# Patient Record
Sex: MALE | Race: BLACK OR AFRICAN AMERICAN | Employment: PART TIME | URBAN - METROPOLITAN AREA
[De-identification: names, ages, dates, MRNs, and addresses within clinical notes are randomized per-mention and may not be internally consistent; named-entity substitution may affect disease eponyms.]

---

## 2019-03-17 ENCOUNTER — HOSPITAL ENCOUNTER (EMERGENCY)
Facility: HOSPITAL | Age: 54
Discharge: HOME | End: 2019-03-17
Attending: EMERGENCY MEDICINE
Payer: MEDICAID

## 2019-03-17 VITALS
OXYGEN SATURATION: 96 % | HEIGHT: 69 IN | SYSTOLIC BLOOD PRESSURE: 132 MMHG | HEART RATE: 70 BPM | TEMPERATURE: 98 F | BODY MASS INDEX: 29.62 KG/M2 | WEIGHT: 200 LBS | DIASTOLIC BLOOD PRESSURE: 91 MMHG | RESPIRATION RATE: 10 BRPM

## 2019-03-17 DIAGNOSIS — R07.9 CHEST PAIN OF UNCERTAIN ETIOLOGY: Primary | ICD-10-CM

## 2019-03-17 LAB
ANION GAP SERPL CALC-SCNC: 6 MMOL/L (ref 0–18)
BASOPHILS # BLD AUTO: 0.04 X10(3) UL (ref 0–0.2)
BASOPHILS NFR BLD AUTO: 0.8 %
BUN BLD-MCNC: 17 MG/DL (ref 7–18)
BUN/CREAT SERPL: 15.6 (ref 10–20)
CALCIUM BLD-MCNC: 8.6 MG/DL (ref 8.5–10.1)
CHLORIDE SERPL-SCNC: 103 MMOL/L (ref 98–107)
CO2 SERPL-SCNC: 29 MMOL/L (ref 21–32)
CREAT BLD-MCNC: 1.09 MG/DL (ref 0.7–1.3)
DEPRECATED RDW RBC AUTO: 46.7 FL (ref 35.1–46.3)
EOSINOPHIL # BLD AUTO: 0.04 X10(3) UL (ref 0–0.7)
EOSINOPHIL NFR BLD AUTO: 0.8 %
ERYTHROCYTE [DISTWIDTH] IN BLOOD BY AUTOMATED COUNT: 15.6 % (ref 11–15)
GLUCOSE BLD-MCNC: 73 MG/DL (ref 70–99)
HCT VFR BLD AUTO: 45.2 % (ref 39–53)
HGB BLD-MCNC: 14.4 G/DL (ref 13–17.5)
IMM GRANULOCYTES # BLD AUTO: 0.01 X10(3) UL (ref 0–1)
IMM GRANULOCYTES NFR BLD: 0.2 %
LYMPHOCYTES # BLD AUTO: 1.75 X10(3) UL (ref 1–4)
LYMPHOCYTES NFR BLD AUTO: 33.2 %
MCH RBC QN AUTO: 26.6 PG (ref 26–34)
MCHC RBC AUTO-ENTMCNC: 31.9 G/DL (ref 31–37)
MCV RBC AUTO: 83.5 FL (ref 80–100)
MONOCYTES # BLD AUTO: 0.73 X10(3) UL (ref 0.1–1)
MONOCYTES NFR BLD AUTO: 13.9 %
NEUTROPHILS # BLD AUTO: 2.7 X10 (3) UL (ref 1.5–7.7)
NEUTROPHILS # BLD AUTO: 2.7 X10(3) UL (ref 1.5–7.7)
NEUTROPHILS NFR BLD AUTO: 51.1 %
OSMOLALITY SERPL CALC.SUM OF ELEC: 286 MOSM/KG (ref 275–295)
PLATELET # BLD AUTO: 324 10(3)UL (ref 150–450)
POTASSIUM SERPL-SCNC: 3.3 MMOL/L (ref 3.5–5.1)
RBC # BLD AUTO: 5.41 X10(6)UL (ref 4.3–5.7)
SODIUM SERPL-SCNC: 138 MMOL/L (ref 136–145)
TROPONIN I SERPL-MCNC: <0.045 NG/ML (ref ?–0.04)
WBC # BLD AUTO: 5.3 X10(3) UL (ref 4–11)

## 2019-03-17 PROCEDURE — 84484 ASSAY OF TROPONIN QUANT: CPT | Performed by: EMERGENCY MEDICINE

## 2019-03-17 PROCEDURE — 93010 ELECTROCARDIOGRAM REPORT: CPT | Performed by: EMERGENCY MEDICINE

## 2019-03-17 PROCEDURE — 80048 BASIC METABOLIC PNL TOTAL CA: CPT | Performed by: EMERGENCY MEDICINE

## 2019-03-17 PROCEDURE — 99284 EMERGENCY DEPT VISIT MOD MDM: CPT

## 2019-03-17 PROCEDURE — 85025 COMPLETE CBC W/AUTO DIFF WBC: CPT | Performed by: EMERGENCY MEDICINE

## 2019-03-17 PROCEDURE — 93005 ELECTROCARDIOGRAM TRACING: CPT

## 2019-03-17 NOTE — ED NOTES
Patient stating he is feeling better. Chest pain is resolving. Eyes are bloodshot. States he does not want his mother to know about his drug use.

## 2019-03-18 NOTE — ED PROVIDER NOTES
Patient Seen in: [facility] AND [facility] Emergency Department    History   Patient presents with:  Chest Pain Angina (cardiovascular)      HPI    Patient presents to the ED after developing sudden onset right-sided chest pain after inhaling while smoking a cig exhibits no tenderness. Abdominal: Soft. He exhibits no distension. Musculoskeletal: He exhibits no edema or deformity. Neurological: He is alert and oriented to person, place, and time. Skin: Skin is warm and dry.    Psychiatric: He has a normal mo Considerations: Chest wall pain, pneumothorax    Medical Record Review: I personally reviewed available prior medical records for any recent pertinent discharge summaries, testing, and procedures and reviewed those reports. Complicating Factors:  The pat

## 2019-03-28 NOTE — ED PROVIDER NOTES
Patient Seen in: [facility] AND [facility] Emergency Department    History   Patient presents with:  Poisoning/Overdose (metabolic, psychiatric)      HPI    Patient presents to the ED after overdosing on heroin.   He was with a friend tonight with his girlfriend tracheal deviation present. Cardiovascular: Normal rate, regular rhythm, normal heart sounds and intact distal pulses. Pulmonary/Chest: Effort normal and breath sounds normal. No stridor. No respiratory distress. Abdominal: Soft.  He exhibits no diste respiratory depression resolved and patient alert and oriented following. However roughly 1 hour later he again became slightly somnolent although minimal respiratory depression at this time.   IM Narcan administered and patient returned to normal mental s

## 2019-03-28 NOTE — ED NOTES
Patient with decreased resp drive and somnolence despite narcan nebulizer. Per MD Liz Chen, narcan 0.4mg IVP administered as noted. Patient now awake, c/o being cold, family at bedside. On cardiac monitor. Will continue to monitor.

## 2019-03-28 NOTE — ED NOTES
Pt remains awake and alert, requesting more blankets. Pt is tearful and shaky. Pts family remains at bedside. Will continue to monitor.

## 2019-03-28 NOTE — ED NOTES
Pt completely awake and alert. Ambulatory with a steady gait. Per EVA, okay for pt to be d/c to home with family members. Will continue to monitor.

## 2019-03-28 NOTE — ED NOTES
Pt brought in by girlfriend after becoming responsive after taking a bag of heroin. Pts girlfriend states that she administered narcan intranasally about 30min pta. During assessment, pt is drowsy, but wakes to verbal stimulus.  Pt not responding to [employer]

## 2019-03-28 NOTE — ED NOTES
Pt provided with discharge instructions, outpatient resources, and prescription. Verbalized understanding for plan of care at home and follow up. All questions/concerns addressed prior to discharge. Pt wheeled out of er with family members.

## 2020-01-25 ENCOUNTER — APPOINTMENT (OUTPATIENT)
Dept: GENERAL RADIOLOGY | Facility: HOSPITAL | Age: 55
End: 2020-01-25
Payer: MEDICAID

## 2020-01-25 ENCOUNTER — HOSPITAL ENCOUNTER (EMERGENCY)
Facility: HOSPITAL | Age: 55
Discharge: HOME | End: 2020-01-25
Payer: MEDICAID

## 2020-01-25 VITALS
TEMPERATURE: 99 F | BODY MASS INDEX: 30.66 KG/M2 | SYSTOLIC BLOOD PRESSURE: 132 MMHG | HEIGHT: 69 IN | OXYGEN SATURATION: 100 % | WEIGHT: 207 LBS | HEART RATE: 86 BPM | DIASTOLIC BLOOD PRESSURE: 78 MMHG | RESPIRATION RATE: 20 BRPM

## 2020-01-25 DIAGNOSIS — J11.1 INFLUENZA: Primary | ICD-10-CM

## 2020-01-25 LAB
FLUAV + FLUBV RNA SPEC NAA+PROBE: NEGATIVE
FLUAV + FLUBV RNA SPEC NAA+PROBE: NEGATIVE
FLUAV + FLUBV RNA SPEC NAA+PROBE: POSITIVE

## 2020-01-25 PROCEDURE — 99284 EMERGENCY DEPT VISIT MOD MDM: CPT

## 2020-01-25 PROCEDURE — 71046 X-RAY EXAM CHEST 2 VIEWS: CPT

## 2020-01-25 PROCEDURE — 87631 RESP VIRUS 3-5 TARGETS: CPT | Performed by: EMERGENCY MEDICINE

## 2020-01-25 PROCEDURE — 94640 AIRWAY INHALATION TREATMENT: CPT

## 2020-01-25 RX ORDER — LISINOPRIL 10 MG/1
10 TABLET ORAL DAILY
COMMUNITY

## 2020-01-25 RX ORDER — IBUPROFEN 600 MG/1
600 TABLET ORAL EVERY 6 HOURS PRN
Status: DISCONTINUED | OUTPATIENT
Start: 2020-01-25 | End: 2020-01-25

## 2020-01-25 RX ORDER — IBUPROFEN 600 MG/1
600 TABLET ORAL EVERY 8 HOURS PRN
Qty: 30 TABLET | Refills: 0 | Status: SHIPPED | OUTPATIENT
Start: 2020-01-25

## 2020-01-25 RX ORDER — ACETAMINOPHEN 325 MG/1
650 TABLET ORAL ONCE
Status: COMPLETED | OUTPATIENT
Start: 2020-01-25 | End: 2020-01-25

## 2020-01-25 RX ORDER — IPRATROPIUM BROMIDE AND ALBUTEROL SULFATE 2.5; .5 MG/3ML; MG/3ML
3 SOLUTION RESPIRATORY (INHALATION) EVERY 6 HOURS PRN
Status: DISCONTINUED | OUTPATIENT
Start: 2020-01-25 | End: 2020-01-25

## 2020-01-25 RX ORDER — ALBUTEROL SULFATE 90 UG/1
2 AEROSOL, METERED RESPIRATORY (INHALATION) EVERY 4 HOURS PRN
Qty: 1 INHALER | Refills: 0 | Status: SHIPPED | OUTPATIENT
Start: 2020-01-25 | End: 2020-02-24

## 2020-01-25 RX ORDER — IPRATROPIUM BROMIDE AND ALBUTEROL SULFATE 2.5; .5 MG/3ML; MG/3ML
3 SOLUTION RESPIRATORY (INHALATION) ONCE
Status: COMPLETED | OUTPATIENT
Start: 2020-01-25 | End: 2020-01-25

## 2020-01-25 NOTE — ED NOTES
Pt presents with high fever and productive cough since Thursday. Endorses body aches/chills. Denies cp, sob, n/v/d. Wheezes heard on auscultation. PMH HTN, DM  AOx3, RR even/nonlabored.

## 2020-01-26 NOTE — ED PROVIDER NOTES
Patient Seen in: [facility] Emergency Department    History   Patient presents with:  Fever      HPI    Patient presents to the ED complaining of a cough, fever, body aches and chills x2 days. Symptoms persistent and moderate in severity.   Not be and expiratory wheezing, no respiratory distress   Abdominal: Soft. He exhibits no distension. Musculoskeletal:         General: No deformity or edema. Neurological: He is alert and oriented to person, place, and time. Skin: Skin is warm and dry.    P records for any recent pertinent discharge summaries, testing, and procedures and reviewed those reports. Complicating Factors: The patient already has does not have a problem list on file. to contribute to the complexity of this ED evaluation.     ED Co

## 2021-06-18 ENCOUNTER — HOSPITAL ENCOUNTER (EMERGENCY)
Facility: HOSPITAL | Age: 56
Discharge: HOME | End: 2021-06-18
Attending: EMERGENCY MEDICINE
Payer: OTHER MISCELLANEOUS

## 2021-06-18 ENCOUNTER — APPOINTMENT (OUTPATIENT)
Dept: GENERAL RADIOLOGY | Facility: HOSPITAL | Age: 56
End: 2021-06-18
Attending: EMERGENCY MEDICINE
Payer: OTHER MISCELLANEOUS

## 2021-06-18 ENCOUNTER — APPOINTMENT (OUTPATIENT)
Dept: CT IMAGING | Facility: HOSPITAL | Age: 56
End: 2021-06-18
Attending: EMERGENCY MEDICINE
Payer: OTHER MISCELLANEOUS

## 2021-06-18 VITALS
TEMPERATURE: 98 F | HEIGHT: 70 IN | DIASTOLIC BLOOD PRESSURE: 91 MMHG | OXYGEN SATURATION: 97 % | RESPIRATION RATE: 16 BRPM | SYSTOLIC BLOOD PRESSURE: 132 MMHG | WEIGHT: 221 LBS | BODY MASS INDEX: 31.64 KG/M2 | HEART RATE: 80 BPM

## 2021-06-18 DIAGNOSIS — S82.191A OTHER CLOSED FRACTURE OF PROXIMAL END OF RIGHT TIBIA, INITIAL ENCOUNTER: Primary | ICD-10-CM

## 2021-06-18 PROCEDURE — 73700 CT LOWER EXTREMITY W/O DYE: CPT | Performed by: EMERGENCY MEDICINE

## 2021-06-18 PROCEDURE — 73560 X-RAY EXAM OF KNEE 1 OR 2: CPT | Performed by: EMERGENCY MEDICINE

## 2021-06-18 PROCEDURE — 99284 EMERGENCY DEPT VISIT MOD MDM: CPT

## 2021-06-18 RX ORDER — ACETAMINOPHEN 500 MG
1000 TABLET ORAL EVERY 6 HOURS PRN
Qty: 100 TABLET | Refills: 0 | Status: SHIPPED | OUTPATIENT
Start: 2021-06-18

## 2021-06-18 NOTE — ED PROVIDER NOTES
Patient Seen in: [facility] AND [facility] Emergency Department      History   Patient presents with:  Leg or Foot Injury    Stated Complaint: right knee injury     HPI/Subjective:   HPI    Patient presents to the emergency department after injuring his right kn regular rhythm. Heart sounds: No murmur heard. Pulmonary:      Effort: Pulmonary effort is normal. No respiratory distress. Breath sounds: Normal breath sounds. Abdominal:      General: There is no distension.       Palpations: Abdomen is so (CPT=73700)    Result Date: 6/18/2021  CONCLUSION:  1. Comminuted fracture through the base of the tibial spines / intercondylar notch and involving the mesial aspect of the medial tibial plateau.   2. Fracture involves the footplate of the anterior posteri

## 2021-06-18 NOTE — ED INITIAL ASSESSMENT (HPI)
Patient arrives from EMS for right knee injury from hose at work. Denies hitting head. Left elbow abrasion. CMS intact, no deformities noted.  Works for [employer]

## 2024-11-12 ENCOUNTER — APPOINTMENT (OUTPATIENT)
Dept: GENERAL RADIOLOGY | Facility: HOSPITAL | Age: 59
End: 2024-11-12
Payer: COMMERCIAL

## 2024-11-12 ENCOUNTER — HOSPITAL ENCOUNTER (EMERGENCY)
Facility: HOSPITAL | Age: 59
Discharge: HOME | End: 2024-11-12
Attending: EMERGENCY MEDICINE
Payer: COMMERCIAL

## 2024-11-12 VITALS
SYSTOLIC BLOOD PRESSURE: 123 MMHG | HEIGHT: 70 IN | RESPIRATION RATE: 15 BRPM | WEIGHT: 217 LBS | DIASTOLIC BLOOD PRESSURE: 96 MMHG | TEMPERATURE: 98 F | HEART RATE: 81 BPM | BODY MASS INDEX: 31.07 KG/M2 | OXYGEN SATURATION: 98 %

## 2024-11-12 DIAGNOSIS — J45.41 MODERATE PERSISTENT ASTHMA WITH EXACERBATION (HCC): Primary | ICD-10-CM

## 2024-11-12 LAB
ATRIAL RATE: 81 BPM
P AXIS: 57 DEGREES
P-R INTERVAL: 164 MS
Q-T INTERVAL: 358 MS
QRS DURATION: 76 MS
QTC CALCULATION (BEZET): 415 MS
R AXIS: 28 DEGREES
T AXIS: 27 DEGREES
VENTRICULAR RATE: 81 BPM

## 2024-11-12 PROCEDURE — 71045 X-RAY EXAM CHEST 1 VIEW: CPT

## 2024-11-12 PROCEDURE — 93010 ELECTROCARDIOGRAM REPORT: CPT

## 2024-11-12 PROCEDURE — 99284 EMERGENCY DEPT VISIT MOD MDM: CPT

## 2024-11-12 PROCEDURE — 94644 CONT INHLJ TX 1ST HOUR: CPT

## 2024-11-12 PROCEDURE — 93005 ELECTROCARDIOGRAM TRACING: CPT

## 2024-11-12 RX ORDER — ALBUTEROL SULFATE 5 MG/ML
10 SOLUTION RESPIRATORY (INHALATION) ONCE
Status: DISCONTINUED | OUTPATIENT
Start: 2024-11-12 | End: 2024-11-12

## 2024-11-12 RX ORDER — ALBUTEROL SULFATE 5 MG/ML
10 SOLUTION RESPIRATORY (INHALATION) CONTINUOUS
Status: DISCONTINUED | OUTPATIENT
Start: 2024-11-12 | End: 2024-11-12

## 2024-11-12 RX ORDER — PREDNISONE 20 MG/1
60 TABLET ORAL ONCE
Status: COMPLETED | OUTPATIENT
Start: 2024-11-12 | End: 2024-11-12

## 2024-11-12 RX ORDER — ALBUTEROL SULFATE 90 UG/1
2 INHALANT RESPIRATORY (INHALATION) EVERY 4 HOURS PRN
Qty: 1 EACH | Refills: 0 | Status: SHIPPED | OUTPATIENT
Start: 2024-11-12 | End: 2024-12-12

## 2024-11-12 RX ORDER — PREDNISONE 20 MG/1
40 TABLET ORAL DAILY
Qty: 10 TABLET | Refills: 0 | Status: SHIPPED | OUTPATIENT
Start: 2024-11-12 | End: 2024-11-17

## 2024-11-12 NOTE — ED PROVIDER NOTES
Patient Seen in: [facility] Emergency Department    History     Chief Complaint   Patient presents with    Difficulty Breathing       HPI    History of diabetes hypertension and asthma presents to the ED complaining of shortness of breath and coughing for the past 2 days.  He states he feels like he has an exacerbation of his asthma.  Does admit to ongoing cigarette smoking.  Denies fevers chills or other complaints.  Shortness of breath is worse when he walks.  No other complaints.    History reviewed.   Past Medical History:    Diabetes (HCC)    Essential hypertension    Heroin abuse (HCC)       History reviewed. History reviewed. No pertinent surgical history.      Medications :  Prescriptions Prior to Admission[1]     History reviewed. No pertinent family history.    Smoking Status:   Social History     Socioeconomic History    Marital status: Single   Tobacco Use    Smoking status: Every Day    Smokeless tobacco: Never       Constitutional and vital signs reviewed.      Social History and Family History elements reviewed from today, pertinent positives to the presenting problem noted.    Physical Exam     ED Triage Vitals   BP 11/12/24 1437 (!) 143/102   Pulse 11/12/24 1436 85   Resp 11/12/24 1436 18   Temp 11/12/24 1438 97.7 °F (36.5 °C)   Temp src 11/12/24 1438 Oral   SpO2 11/12/24 1436 93 %   O2 Device 11/12/24 1545 None (Room air)       All measures to prevent infection transmission during my interaction with the patient were taken. Handwashing was performed prior to and after the exam.  Stethoscope and any equipment used during my examination was cleaned with super sani-cloth germicidal wipes following the exam.     Physical Exam  Vitals and nursing note reviewed.   Constitutional:       General: He is not in acute distress.     Appearance: He is obese. He is not ill-appearing or toxic-appearing.   HENT:      Head: Normocephalic and atraumatic.   Eyes:      General:         Right eye: No  discharge.         Left eye: No discharge.      Conjunctiva/sclera: Conjunctivae normal.   Neck:      Trachea: No tracheal deviation.   Cardiovascular:      Rate and Rhythm: Normal rate.   Pulmonary:      Effort: Pulmonary effort is normal. No respiratory distress.      Breath sounds: No stridor. Wheezing present.      Comments: Diffuse expiratory wheezing, no respiratory distress  Abdominal:      General: There is no distension.      Palpations: Abdomen is soft.   Musculoskeletal:         General: No deformity.   Skin:     General: Skin is warm and dry.   Neurological:      Mental Status: He is alert and oriented to person, place, and time.   Psychiatric:         Mood and Affect: Mood normal.         Behavior: Behavior normal.         ED Course      Labs Reviewed - No data to display  EKG    Rate, intervals and axes as noted on EKG Report.  Rate: Normal, 81 bpm  Rhythm: Sinus Rhythm  Reading: Normal intervals, nonspecific T wave inversion, abnormal EKG           As Interpreted by me    Imaging Results Available and Reviewed while in ED: XR CHEST AP PORTABLE  (CPT=71045)    Result Date: 11/12/2024  CONCLUSION:  1. Unchanged chest.  No acute appearing disease.    Dictated by (CST): Reji Leija MD on 11/12/2024 at 3:15 PM     Finalized by (CST): Reji Leija MD on 11/12/2024 at 3:16 PM         ED Medications Administered:   Medications   albuterol (Ventolin) (5 MG/ML) 0.5% nebulizer solution 10 mg (10 mg Nebulization New Bag 11/12/24 1621)   predniSONE (Deltasone) tab 60 mg (60 mg Oral Given 11/12/24 1656)         MDM     Vitals:    11/12/24 1438 11/12/24 1545 11/12/24 1630 11/12/24 1645   BP: (!) 145/95 (!) 146/99 (!) 134/96 (!) 123/96   Pulse:  75 75 81   Resp:  11 10 15   Temp: 97.7 °F (36.5 °C)      TempSrc: Oral      SpO2:  92% 100% 98%   Weight:       Height:         *I personally reviewed and interpreted all ED vitals.    Pulse Ox: 92%, Room air, Normal     Monitor Interpretation:   normal sinus rhythm as  interpreted by me.  The cardiac monitor was ordered monitor heart rate.    Differential Diagnosis/ Diagnostic Considerations: Asthma exacerbation, pneumonia, pneumothorax,    Complicating Factors: The patient already has does not have a problem list on file. to contribute to the complexity of this ED evaluation.    Medical Decision Making  Patient presents to the ED with asthma exacerbation symptoms.  Diffuse wheezing on exam and no respiratory distress.  X-ray without pneumonia or other concerning findings.  Patient improved dramatically with an hour-long breathing treatment and oral steroids in the ED.  Wheezing resolved on reexamination and stable for discharge with continued oral steroids and an inhaler.  PCP follow-up recommended.    Problems Addressed:  Moderate persistent asthma with exacerbation (HCC): acute illness or injury    Amount and/or Complexity of Data Reviewed  Radiology: ordered and independent interpretation performed. Decision-making details documented in ED Course.     Details: I personally reviewed the patient's chest x-ray image and noted no pneumothorax or pneumonia    Risk  Prescription drug management.        Condition upon leaving the department: Stable    Disposition and Plan     Clinical Impression:  1. Moderate persistent asthma with exacerbation (HCC)        Disposition:  Discharge    Follow-up:  Sherman Styles MD  [address]  [address] [facility]  [place] [id number] [phone]    Schedule an appointment as soon as possible for a visit in 3 day(s)        Medications Prescribed:  Current Discharge Medication List        START taking these medications    Details   albuterol 108 (90 Base) MCG/ACT Inhalation Aero Soln Inhale 2 puffs into the lungs every 4 (four) hours as needed for Wheezing.  Qty: 1 each, Refills: 0      predniSONE 20 MG Oral Tab Take 2 tablets (40 mg total) by mouth daily for 5 days.  Qty: 10 tablet, Refills: 0                              [1] (Not in a hospital admission)

## 2025-05-11 ENCOUNTER — APPOINTMENT (OUTPATIENT)
Dept: GENERAL RADIOLOGY | Facility: HOSPITAL | Age: 60
End: 2025-05-11
Attending: EMERGENCY MEDICINE
Payer: MEDICAID

## 2025-05-11 ENCOUNTER — HOSPITAL ENCOUNTER (INPATIENT)
Facility: HOSPITAL | Age: 60
LOS: 2 days | Discharge: HOME | End: 2025-05-13
Attending: EMERGENCY MEDICINE | Admitting: INTERNAL MEDICINE
Payer: MEDICAID

## 2025-05-11 DIAGNOSIS — J45.909 ACUTE ASTHMA (HCC): ICD-10-CM

## 2025-05-11 DIAGNOSIS — J96.01 ACUTE HYPOXEMIC RESPIRATORY FAILURE (HCC): Primary | ICD-10-CM

## 2025-05-11 LAB
ALBUMIN SERPL-MCNC: 4.5 G/DL (ref 3.2–4.8)
ALBUMIN/GLOB SERPL: 1.6 {RATIO} (ref 1–2)
ALP LIVER SERPL-CCNC: 90 U/L (ref 45–117)
ALT SERPL-CCNC: 38 U/L (ref 10–49)
ANION GAP SERPL CALC-SCNC: 5 MMOL/L (ref 0–18)
AST SERPL-CCNC: 31 U/L (ref ?–34)
BASOPHILS # BLD AUTO: 0.03 X10(3) UL (ref 0–0.2)
BASOPHILS NFR BLD AUTO: 0.5 %
BILIRUB SERPL-MCNC: 0.5 MG/DL (ref 0.3–1.2)
BUN BLD-MCNC: 9 MG/DL (ref 9–23)
BUN/CREAT SERPL: 11.8 (ref 10–20)
CALCIUM BLD-MCNC: 8.9 MG/DL (ref 8.7–10.4)
CHLORIDE SERPL-SCNC: 105 MMOL/L (ref 98–112)
CO2 SERPL-SCNC: 32 MMOL/L (ref 21–32)
CREAT BLD-MCNC: 0.76 MG/DL (ref 0.7–1.3)
DEPRECATED RDW RBC AUTO: 49.7 FL (ref 35.1–46.3)
EGFRCR SERPLBLD CKD-EPI 2021: 105 ML/MIN/1.73M2 (ref 60–?)
EOSINOPHIL # BLD AUTO: 0.05 X10(3) UL (ref 0–0.7)
EOSINOPHIL NFR BLD AUTO: 0.8 %
ERYTHROCYTE [DISTWIDTH] IN BLOOD BY AUTOMATED COUNT: 16 % (ref 11–15)
EST. AVERAGE GLUCOSE BLD GHB EST-MCNC: 163 MG/DL (ref 68–126)
FLUAV + FLUBV RNA SPEC NAA+PROBE: NEGATIVE
FLUAV + FLUBV RNA SPEC NAA+PROBE: NEGATIVE
GLOBULIN PLAS-MCNC: 2.9 G/DL (ref 2–3.5)
GLUCOSE BLD-MCNC: 84 MG/DL (ref 70–99)
GLUCOSE BLDC GLUCOMTR-MCNC: 144 MG/DL (ref 70–99)
GLUCOSE BLDC GLUCOMTR-MCNC: 211 MG/DL (ref 70–99)
GLUCOSE BLDC GLUCOMTR-MCNC: 87 MG/DL (ref 70–99)
HBA1C MFR BLD: 7.3 % (ref ?–5.7)
HCT VFR BLD AUTO: 43.3 % (ref 39–53)
HGB BLD-MCNC: 13.2 G/DL (ref 13–17.5)
IMM GRANULOCYTES # BLD AUTO: 0.07 X10(3) UL (ref 0–1)
IMM GRANULOCYTES NFR BLD: 1.1 %
LYMPHOCYTES # BLD AUTO: 1.94 X10(3) UL (ref 1–4)
LYMPHOCYTES NFR BLD AUTO: 30.7 %
MCH RBC QN AUTO: 25.7 PG (ref 26–34)
MCHC RBC AUTO-ENTMCNC: 30.5 G/DL (ref 31–37)
MCV RBC AUTO: 84.4 FL (ref 80–100)
MONOCYTES # BLD AUTO: 0.75 X10(3) UL (ref 0.1–1)
MONOCYTES NFR BLD AUTO: 11.9 %
NEUTROPHILS # BLD AUTO: 3.47 X10 (3) UL (ref 1.5–7.7)
NEUTROPHILS # BLD AUTO: 3.47 X10(3) UL (ref 1.5–7.7)
NEUTROPHILS NFR BLD AUTO: 55 %
NT-PROBNP SERPL-MCNC: 74 PG/ML (ref ?–125)
OSMOLALITY SERPL CALC.SUM OF ELEC: 292 MOSM/KG (ref 275–295)
PLATELET # BLD AUTO: 331 10(3)UL (ref 150–450)
POTASSIUM SERPL-SCNC: 3.7 MMOL/L (ref 3.5–5.1)
PROT SERPL-MCNC: 7.4 G/DL (ref 5.7–8.2)
RBC # BLD AUTO: 5.13 X10(6)UL (ref 4.3–5.7)
RSV RNA SPEC NAA+PROBE: NEGATIVE
SARS-COV-2 RNA RESP QL NAA+PROBE: NOT DETECTED
SODIUM SERPL-SCNC: 142 MMOL/L (ref 136–145)
WBC # BLD AUTO: 6.3 X10(3) UL (ref 4–11)

## 2025-05-11 PROCEDURE — 0241U SARS-COV-2/FLU A AND B/RSV BY PCR (GENEXPERT): CPT | Performed by: EMERGENCY MEDICINE

## 2025-05-11 PROCEDURE — 85025 COMPLETE CBC W/AUTO DIFF WBC: CPT | Performed by: EMERGENCY MEDICINE

## 2025-05-11 PROCEDURE — 82962 GLUCOSE BLOOD TEST: CPT

## 2025-05-11 PROCEDURE — 93005 ELECTROCARDIOGRAM TRACING: CPT

## 2025-05-11 PROCEDURE — 93010 ELECTROCARDIOGRAM REPORT: CPT

## 2025-05-11 PROCEDURE — 94645 CONT INHLJ TX EACH ADDL HOUR: CPT

## 2025-05-11 PROCEDURE — 83036 HEMOGLOBIN GLYCOSYLATED A1C: CPT | Performed by: EMERGENCY MEDICINE

## 2025-05-11 PROCEDURE — 71045 X-RAY EXAM CHEST 1 VIEW: CPT | Performed by: EMERGENCY MEDICINE

## 2025-05-11 PROCEDURE — 99285 EMERGENCY DEPT VISIT HI MDM: CPT

## 2025-05-11 PROCEDURE — 96375 TX/PRO/DX INJ NEW DRUG ADDON: CPT

## 2025-05-11 PROCEDURE — 94640 AIRWAY INHALATION TREATMENT: CPT

## 2025-05-11 PROCEDURE — 94760 N-INVAS EAR/PLS OXIMETRY 1: CPT

## 2025-05-11 PROCEDURE — 96365 THER/PROPH/DIAG IV INF INIT: CPT

## 2025-05-11 PROCEDURE — 83880 ASSAY OF NATRIURETIC PEPTIDE: CPT | Performed by: EMERGENCY MEDICINE

## 2025-05-11 PROCEDURE — 94644 CONT INHLJ TX 1ST HOUR: CPT

## 2025-05-11 PROCEDURE — 80053 COMPREHEN METABOLIC PANEL: CPT | Performed by: EMERGENCY MEDICINE

## 2025-05-11 RX ORDER — METHYLPREDNISOLONE SODIUM SUCCINATE 40 MG/ML
40 INJECTION INTRAMUSCULAR; INTRAVENOUS EVERY 8 HOURS
Status: DISPENSED | OUTPATIENT
Start: 2025-05-11 | End: 2025-05-13

## 2025-05-11 RX ORDER — ENOXAPARIN SODIUM 100 MG/ML
40 INJECTION SUBCUTANEOUS DAILY
Status: DISCONTINUED | OUTPATIENT
Start: 2025-05-12 | End: 2025-05-13

## 2025-05-11 RX ORDER — NIFEDIPINE 30 MG
30 TABLET, EXTENDED RELEASE ORAL EVERY MORNING
COMMUNITY

## 2025-05-11 RX ORDER — ACETAMINOPHEN 500 MG
500 TABLET ORAL EVERY 4 HOURS PRN
Status: DISCONTINUED | OUTPATIENT
Start: 2025-05-11 | End: 2025-05-13

## 2025-05-11 RX ORDER — IPRATROPIUM BROMIDE AND ALBUTEROL SULFATE 2.5; .5 MG/3ML; MG/3ML
3 SOLUTION RESPIRATORY (INHALATION)
Status: DISCONTINUED | OUTPATIENT
Start: 2025-05-12 | End: 2025-05-13

## 2025-05-11 RX ORDER — POLYETHYLENE GLYCOL 3350 17 G/17G
17 POWDER, FOR SOLUTION ORAL DAILY PRN
Status: DISCONTINUED | OUTPATIENT
Start: 2025-05-11 | End: 2025-05-13

## 2025-05-11 RX ORDER — NICOTINE POLACRILEX 4 MG
30 LOZENGE BUCCAL
Status: DISCONTINUED | OUTPATIENT
Start: 2025-05-11 | End: 2025-05-13

## 2025-05-11 RX ORDER — ATORVASTATIN CALCIUM 10 MG/1
10 TABLET, FILM COATED ORAL DAILY
Status: DISCONTINUED | OUTPATIENT
Start: 2025-05-12 | End: 2025-05-13

## 2025-05-11 RX ORDER — ALBUTEROL SULFATE 5 MG/ML
10 SOLUTION RESPIRATORY (INHALATION) ONCE
Status: COMPLETED | OUTPATIENT
Start: 2025-05-11 | End: 2025-05-11

## 2025-05-11 RX ORDER — DEXTROSE MONOHYDRATE 25 G/50ML
50 INJECTION, SOLUTION INTRAVENOUS
Status: DISCONTINUED | OUTPATIENT
Start: 2025-05-11 | End: 2025-05-13

## 2025-05-11 RX ORDER — POTASSIUM CHLORIDE 1.5 G/1.58G
40 POWDER, FOR SOLUTION ORAL ONCE
Status: COMPLETED | OUTPATIENT
Start: 2025-05-11 | End: 2025-05-11

## 2025-05-11 RX ORDER — ERGOCALCIFEROL 1.25 MG/1
50000 CAPSULE, LIQUID FILLED ORAL
COMMUNITY

## 2025-05-11 RX ORDER — ATORVASTATIN CALCIUM 10 MG/1
10 TABLET, FILM COATED ORAL DAILY
COMMUNITY

## 2025-05-11 RX ORDER — SENNOSIDES 8.6 MG
17.2 TABLET ORAL NIGHTLY PRN
Status: DISCONTINUED | OUTPATIENT
Start: 2025-05-11 | End: 2025-05-13

## 2025-05-11 RX ORDER — NICOTINE POLACRILEX 4 MG
15 LOZENGE BUCCAL
Status: DISCONTINUED | OUTPATIENT
Start: 2025-05-11 | End: 2025-05-13

## 2025-05-11 RX ORDER — VALSARTAN 320 MG/1
320 TABLET ORAL DAILY
COMMUNITY

## 2025-05-11 RX ORDER — NIFEDIPINE 30 MG/1
30 TABLET, EXTENDED RELEASE ORAL EVERY MORNING
Status: DISCONTINUED | OUTPATIENT
Start: 2025-05-12 | End: 2025-05-12

## 2025-05-11 RX ORDER — ALBUTEROL SULFATE 90 UG/1
2 INHALANT RESPIRATORY (INHALATION) EVERY 6 HOURS PRN
COMMUNITY

## 2025-05-11 RX ORDER — PROCHLORPERAZINE EDISYLATE 5 MG/ML
5 INJECTION INTRAMUSCULAR; INTRAVENOUS EVERY 8 HOURS PRN
Status: DISCONTINUED | OUTPATIENT
Start: 2025-05-11 | End: 2025-05-13

## 2025-05-11 RX ORDER — IPRATROPIUM BROMIDE AND ALBUTEROL SULFATE 2.5; .5 MG/3ML; MG/3ML
3 SOLUTION RESPIRATORY (INHALATION)
Status: DISCONTINUED | OUTPATIENT
Start: 2025-05-11 | End: 2025-05-11

## 2025-05-11 RX ORDER — MULTIVIT-MIN/IRON FUM/FOLIC AC 7.5 MG-4
1 TABLET ORAL DAILY
COMMUNITY

## 2025-05-11 RX ORDER — ONDANSETRON 2 MG/ML
4 INJECTION INTRAMUSCULAR; INTRAVENOUS EVERY 6 HOURS PRN
Status: DISCONTINUED | OUTPATIENT
Start: 2025-05-11 | End: 2025-05-13

## 2025-05-11 RX ORDER — METHYLPREDNISOLONE SODIUM SUCCINATE 40 MG/ML
40 INJECTION INTRAMUSCULAR; INTRAVENOUS ONCE
Status: COMPLETED | OUTPATIENT
Start: 2025-05-11 | End: 2025-05-11

## 2025-05-11 RX ORDER — MAGNESIUM SULFATE HEPTAHYDRATE 40 MG/ML
2 INJECTION, SOLUTION INTRAVENOUS ONCE
Status: COMPLETED | OUTPATIENT
Start: 2025-05-11 | End: 2025-05-11

## 2025-05-11 RX ORDER — VALSARTAN 320 MG/1
320 TABLET ORAL DAILY
Status: DISCONTINUED | OUTPATIENT
Start: 2025-05-12 | End: 2025-05-13

## 2025-05-11 RX ORDER — MONTELUKAST SODIUM 10 MG/1
10 TABLET ORAL DAILY
COMMUNITY

## 2025-05-11 NOTE — H&P
[facility]   INTERNAL MEDICINE HISTORY & PHYSICAL    Subjective:   CHIEF COMPLAINT   Difficulty Breathing    HISTORY OF THE PRESENT ILLNESS    History obtained from patient w/ additional history from review of outside records in care everywhere .    Heladio Riley - 59 year old male presents with dyspnea.    Breathing worse over past few days. Assoc with wheezing. Worse w exertion, better with rest. Better with inhalers.   Asthma after 50, feels similar, has not needed intubation before.   Not on O2 at home.  No chest pain, fevers or chills, swelling in legs  Hasn't seen pulm or had PFT  In ED, afebrile, HR 90s, BP elev. Quad screen neg. CBC/CMP unremarkale. BNP 70s. CXR w/o acute process. Given IV steroids, fannie d/w Dr. Carlos jama consulted. Admitted for further monitoring, testing, & treatment.    REVIEW OF SYSTEMS   Remainder of 12-point ROS negative unless discussed in HPI.     PATIENT HISTORIES  PMHx:  He has a past medical history of Diabetes ([facility]), Essential hypertension, and Heroin abuse ([facility]).  PSHx:  He has no past surgical history on file.   FHx:  His family history is not on file.   SHx: He reports that he has been smoking cigarettes. He has never used smokeless tobacco. He reports that he does not drink alcohol and does not use drugs.    Allergies: He has no known allergies.     Current Outpatient Medications   Medication Instructions    albuterol 108 (90 Base) MCG/ACT Inhalation Aero Soln 2 puffs, Every 6 hours PRN    atorvastatin (LIPITOR) 10 mg, Daily    ergocalciferol (VITAMIN D2) 50,000 Units, Every 7 days    metFORMIN (GLUCOPHAGE) 500 mg, 2 times daily with meals    montelukast (SINGULAIR) 10 mg, Daily    Multiple Vitamins-Minerals (MULTI-VITAMIN/MINERALS) Oral Tab 1 tablet, Daily    NIFEdipine ER (ADALAT CC) 30 mg, Every morning    valsartan (DIOVAN) 320 mg, Daily       Objective:    PHYSICAL EXAMINATION   Vitals: BP (!) 162/107   Pulse 93   Temp 97 °F (36.1 °C) (Temporal)    Resp 14   Wt 230 lb (104.3 kg)   SpO2 97%   BMI 33.00 kg/m²   Gen: NAD, well nourished  Eyes: PERRLA, normal conjunctivae  ENMT: Moist mucous membranes, trachea midline, no pharyngeal erythema, no thyromegaly  CV: RRR, no M/R/G, no peripheral edema  Resp: wheezy, non-labored respirations, symmetric expansion  GI: Soft, NT, ND, + BS, no rebound, no guarding  MSK:  No C/C/E, normal active/passive ROM in C-spine, 5/5 strength in all extremities  Skin: No rashes, visible skin w/o worrisome lesions   Neuro: CN 2-12 grossly intact, sensation intact   Psych: A&Ox3, appropriate mood, conversant w/ linear thought process     DATA REVIEW: LABORATORY VALUES & DIAGNOSTICS  Recent Labs   Lab 05/11/25  1254      K 3.7      CO2 32.0   BUN 9   CREATSERUM 0.76   ANIONGAP 5   GLU 84   CA 8.9   TP 7.4   ALB 4.5   AST 31   ALT 38   ALKPHO 90   BILT 0.5   EGFRCR 105     Recent Labs   Lab 05/11/25  1254   WBC 6.3   HGB 13.2   HCT 43.3   .0   MCV 84.4   MOPERCENT 11.9   EOPERCENT 0.8   BAPERCENT 0.5         Assessment & Plan:    Heladio Riley - 59 year old male admitted 5/11/2025 for dyspnea, presumed asthma exac.    Acute exacerbation of asthma  Acute hypoxic resp failure   - 5L o2 in ED to maintain SpO2. CXR w/o acute process   - Duonebs, steroids, IS/flutter valve airway clearance, RT eval, PTA MDI  - pulm consulted  - Supplemental O2 for goal SpO2 90-93%  - No obvious focal pneumonias - hold abox for now    HTN  HLD  Resume other home meds as able    ACP: full  Ppx: DVT: Enox  Dispo  EDoD:  ~ 5/12 - 5/13    F/u:   - PCP:  PHYSICIAN NONSTAFF    Available via epic secure chat or perfect serve 7A - 7P.    Raj Garcia MD   Internal Medicine - Hospitalist  [facility]   nicotine replacement therapy

## 2025-05-11 NOTE — PLAN OF CARE
Problem: Patient Centered Care  Goal: Patient preferences are identified and integrated in the patient's plan of care  Description: Interventions:- What would you like us to know as we care for you? - Provide timely, complete, and accurate information to patient/family- Incorporate patient and family knowledge, values, beliefs, and cultural backgrounds into the planning and delivery of care- Encourage patient/family to participate in care and decision-making at the level they choose- Honor patient and family perspectives and choices  Outcome: Progressing     Problem: Diabetes/Glucose Control  Goal: Glucose maintained within prescribed range  Description: INTERVENTIONS:- Monitor Blood Glucose as ordered- Assess for signs and symptoms of hyperglycemia and hypoglycemia- Administer ordered medications to maintain glucose within target range- Assess barriers to adequate nutritional intake and initiate nutrition consult as needed- Instruct patient on self management of diabetes  INTERVENTIONS:- Monitor Blood Glucose as ordered- Assess for signs and symptoms of hyperglycemia and hypoglycemia- Administer ordered medications to maintain glucose within target range- Assess barriers to adequate nutritional intake and initiate nutrition consult as needed- Instruct patient on self management of diabetes  Outcome: Progressing     Problem: Patient/Family Goals  Goal: Patient/Family Long Term Goal  Description: Patient's Long Term Goal: Interventions:- - See additional Care Plan goals for specific interventions  Outcome: Progressing  Goal: Patient/Family Short Term Goal  Description: Patient's Short Term Goal: Interventions: - - See additional Care Plan goals for specific interventions  Outcome: Progressing     Problem: RESPIRATORY - ADULT  Goal: Achieves optimal ventilation and oxygenation  Description: INTERVENTIONS:- Assess for changes in respiratory status- Assess for changes in mentation and behavior- Position to facilitate  oxygenation and minimize respiratory effort- Oxygen supplementation based on oxygen saturation or ABGs- Provide Smoking Cessation handout, if applicable- Encourage broncho-pulmonary hygiene including cough, deep breathe, Incentive Spirometry- Assess the need for suctioning and perform as needed- Assess and instruct to report SOB or any respiratory difficulty- Respiratory Therapy support as indicated- Manage/alleviate anxiety- Monitor for signs/symptoms of CO2 retention  Outcome: Progressing     Problem: METABOLIC/FLUID AND ELECTROLYTES - ADULT  Goal: Glucose maintained within prescribed range  Description: INTERVENTIONS:- Monitor Blood Glucose as ordered- Assess for signs and symptoms of hyperglycemia and hypoglycemia- Administer ordered medications to maintain glucose within target range- Assess barriers to adequate nutritional intake and initiate nutrition consult as needed- Instruct patient on self management of diabetes  INTERVENTIONS:- Monitor Blood Glucose as ordered- Assess for signs and symptoms of hyperglycemia and hypoglycemia- Administer ordered medications to maintain glucose within target range- Assess barriers to adequate nutritional intake and initiate nutrition consult as needed- Instruct patient on self management of diabetes  Outcome: Progressing  Goal: Electrolytes maintained within normal limits  Description: INTERVENTIONS:- Monitor labs and rhythm and assess patient for signs and symptoms of electrolyte imbalances- Administer electrolyte replacement as ordered- Monitor response to electrolyte replacements, including rhythm and repeat lab results as appropriate- Fluid restriction as ordered- Instruct patient on fluid and nutrition restrictions as appropriate  Outcome: Progressing

## 2025-05-11 NOTE — ED PROVIDER NOTES
Patient Seen in: [facility] Emergency Department    History     Chief Complaint   Patient presents with    Difficulty Breathing     Stated Complaint: Asthma     HPI    59-year-old male with past medical history of asthma, diabetes, hypertension presenting for evaluation of 4-5 days of ongoing worsening shortness of breath associated with productive cough.  No chest pain.  No leg swelling.  No fevers or chills, sick contacts or recent travel.  Smokes proxy 1 pack of cigarettes per day, denies illicits.  Last asthma exacerbation approximately 7 months ago, no prior admission.  No to be hypoxemic at 79%, improved on 5 L nasal cannula without prior supplemental oxygen requirement.    Past Medical History[1]    Past Surgical History[2]         Family History[3]    Short Social Hx on File[4]    Review of Systems :  Constitutional: As per HPI  Respiratory: (+) cough/dyspnea.  Cardiovascular: Negative for chest pain and palpitations.   Gastrointestinal: Negative for vomiting and abdominal pain.       Positive for stated complaint: Asthma  Other systems are as noted in HPI.  Constitutional and vital signs reviewed.      All other systems reviewed and negative except as noted above.    PSFH elements reviewed from today and agreed except as otherwise stated in HPI.    Physical Exam     ED Triage Vitals   BP 05/11/25 1245 (!) 143/106   Pulse 05/11/25 1245 96   Resp 05/11/25 1245 26   Temp 05/11/25 1245 97 °F (36.1 °C)   Temp src 05/11/25 1245 Temporal   SpO2 05/11/25 1238 (!) 79 %   O2 Device 05/11/25 1238 None (Room air)       Current:BP (!) 143/106   Pulse 96   Temp 97 °F (36.1 °C) (Temporal)   Resp 26   Wt 104.3 kg   SpO2 94%   BMI 33.00 kg/m²         Physical Exam   Constitutional: Tachypneic.  HEENT: MMM.  Head: Normocephalic.   Eyes: No injection.   Cardiovascular: RRR.   Pulmonary/Chest: Tachypneic, diminished aeration with scant expiratory wheezing.  Abdominal: Soft.   Musculoskeletal: No gross  deformity.  Neurological: Alert.   Skin: Skin is warm.   Psychiatric: Cooperative.  Nursing note and vitals reviewed.        ED Course     Labs Reviewed   CBC WITH DIFFERENTIAL WITH PLATELET - Abnormal; Notable for the following components:       Result Value    MCH 25.7 (*)     MCHC 30.5 (*)     RDW-SD 49.7 (*)     RDW 16.0 (*)     All other components within normal limits   COMP METABOLIC PANEL (14) - Normal   PRO BETA NATRIURETIC PEPTIDE - Normal   POCT GLUCOSE - Normal   SARS-COV-2/FLU A AND B/RSV BY PCR (GENEXPERT) - Normal    Narrative:     This test is intended for the qualitative detection and differentiation of SARS-CoV-2, influenza A, influenza B, and respiratory syncytial virus (RSV) viral RNA in nasopharyngeal or nares swabs from individuals suspected of respiratory viral infection consistent with COVID-19 by their healthcare provider. Signs and symptoms of respiratory viral infection due to SARS-CoV-2, influenza, and RSV can be similar.    Test performed using the Xpert Xpress SARS-CoV-2/FLU/RSV (real time RT-PCR)  assay on the [employer] instrument, [employer], [place] [address].   This test is being used under the Food and Drug Administration's Emergency Use Authorization.    The authorized Fact Sheet for Healthcare Providers for this assay is available upon request from the laboratory.   SCAN SLIDE     XR CHEST AP PORTABLE  (CPT=71045)  Result Date: 5/11/2025  PROCEDURE: XR CHEST AP PORTABLE  (CPT=71045) TIME: 1:13 p.m..   COMPARISON: [facility], XR CHEST AP PORTABLE (CPT=71045), 11/12/2024, 3:05 PM.  INDICATIONS: Asthma  TECHNIQUE:   Single view.   FINDINGS:  CARDIAC/VASC: No cardiac silhouette abnormality or cardiomegaly.  Unremarkable pulmonary vasculature.  MEDIAST/PHIL:   No visible mass or adenopathy. LUNGS/PLEURA: No significant pulmonary parenchymal abnormalities.  No effusion or pleural thickening. BONES: Mild degenerative disc disease and spondylosis without visible acute  abnormalities.  OTHER: Negative.          CONCLUSION: No acute cardiopulmonary abnormality.    Dictated by (CST): Suhas Cleveland MD on 5/11/2025 at 1:18 PM     Finalized by (CST): Suhas Cleveland MD on 5/11/2025 at 1:19 PM           EKG    Rate, intervals and axes as noted on EKG Report.  Rate: 95   Rhythm: NSR   Reading: NSR 95 without JERSON as independently interpreted by myself              ED Course as of 05/11/25 1401  ------------------------------------------------------------  Time: 05/11 1348  Comment: Wheezing improved/ongoing after initial nebs/steroids/mag - will admit for continued management.       MDM   DIFFERENTIAL DIAGNOSIS: After history and physical exam differential diagnosis includes but is not limited to acute asthma, pneumothorax, pneumomediastinum, COVID, influenza.    Pulse ox: 94%:Abnormal and Improved after treatment on RA/NC, as independently interpreted by myself    Cardiac Monitor Interpretation:   Pulse Readings from Last 1 Encounters:   05/11/25 96   , sinus,      Medical Decision Making  Evaluation for worsening cough/shortness of breath despite home therapies without chest pain or exertional complaints noted be hypoxemic with scant expiratory wheezing and decreased aeration; symptoms/wheezing and respiratory insufficiency improved though ongoing after initial hour-long neb and parenteral steroids/mag, will admit for continued monitoring/management.  Case discussed with on-call medicine Dr. Garcia for admission and pulmonary Dr. Hernandez in consultation.    Problems Addressed:  Acute asthma (HCC): complicated acute illness or injury with systemic symptoms  Acute hypoxemic respiratory failure (HCC): complicated acute illness or injury with systemic symptoms that poses a threat to life or bodily functions    Amount and/or Complexity of Data Reviewed  Labs: ordered. Decision-making details documented in ED Course.  Radiology: ordered and independent interpretation performed. Decision-making  details documented in ED Course.     Details: CXR without obvious pneumothorax as independently interpreted by myself    ECG/medicine tests: ordered and independent interpretation performed. Decision-making details documented in ED Course.    Risk  Prescription drug management.  Decision regarding hospitalization.    Critical Care  Total time providing critical care: 31 minutes      A total of 31 minutes of critical care time (exclusive of billable procedures) was administered to manage the patient's respiratory instability due to his acute hypoxemic respiratory failure secondary to acute asthma.  This involved direct patient intervention, complex decision making, and/or extensive discussions with the patient, family, and clinical staff.    I was wearing at minimum a facemask and eye protection throughout this encounter with handwashing performed prior and after patient evaluation without personal hand/facial/oropharyngeal contact and gloves worn throughout encounter. See note and/or contact this provider for further PPE details.      Disposition and Plan     Clinical Impression:  1. Acute hypoxemic respiratory failure (HCC)    2. Acute asthma (HCC)        Disposition:  Admit    Follow-up:  No follow-up provider specified.    Medications Prescribed:  Current Discharge Medication List                   [1]   Past Medical History:   Diabetes (HCC)    Essential hypertension    Heroin abuse (HCC)   [2] History reviewed. No pertinent surgical history.  [3] No family history on file.  [4]   Social History  Socioeconomic History    Marital status: Single   Tobacco Use    Smoking status: Every Day     Current packs/day: 1.00     Types: Cigarettes    Smokeless tobacco: Never   Vaping Use    Vaping status: Never Used   Substance and Sexual Activity    Alcohol use: Never    Drug use: Never

## 2025-05-11 NOTE — ED QUICK NOTES
Orders for admission, patient is aware of plan and ready to go upstairs. Any questions, please call ED RN Jurate at extension [phone].     Patient Covid vaccination status: Unvaccinated     COVID Test Ordered in ED: SARS-CoV-2/Flu A and B/RSV by PCR ([employer])    COVID Suspicion at Admission: N/A    Running Infusions: Medication Infusions[1] Mg sulfate @ 150ml/hr.    Mental Status/LOC at time of transport: axox4    Other pertinent information:   CIWA score: N/A   NIH score:  N/A               [1]

## 2025-05-12 ENCOUNTER — APPOINTMENT (OUTPATIENT)
Dept: CV DIAGNOSTICS | Facility: HOSPITAL | Age: 60
End: 2025-05-12
Attending: INTERNAL MEDICINE
Payer: MEDICAID

## 2025-05-12 LAB
ANION GAP SERPL CALC-SCNC: 1 MMOL/L (ref 0–18)
ATRIAL RATE: 95 BPM
BUN BLD-MCNC: 13 MG/DL (ref 9–23)
BUN/CREAT SERPL: 18.3 (ref 10–20)
CALCIUM BLD-MCNC: 8.7 MG/DL (ref 8.7–10.4)
CHLORIDE SERPL-SCNC: 108 MMOL/L (ref 98–112)
CO2 SERPL-SCNC: 31 MMOL/L (ref 21–32)
CREAT BLD-MCNC: 0.71 MG/DL (ref 0.7–1.3)
DEPRECATED RDW RBC AUTO: 49 FL (ref 35.1–46.3)
EGFRCR SERPLBLD CKD-EPI 2021: 107 ML/MIN/1.73M2 (ref 60–?)
ERYTHROCYTE [DISTWIDTH] IN BLOOD BY AUTOMATED COUNT: 16 % (ref 11–15)
GLUCOSE BLD-MCNC: 126 MG/DL (ref 70–99)
GLUCOSE BLDC GLUCOMTR-MCNC: 126 MG/DL (ref 70–99)
GLUCOSE BLDC GLUCOMTR-MCNC: 140 MG/DL (ref 70–99)
GLUCOSE BLDC GLUCOMTR-MCNC: 162 MG/DL (ref 70–99)
GLUCOSE BLDC GLUCOMTR-MCNC: 168 MG/DL (ref 70–99)
GLUCOSE BLDC GLUCOMTR-MCNC: 175 MG/DL (ref 70–99)
HCT VFR BLD AUTO: 42.5 % (ref 39–53)
HGB BLD-MCNC: 12.9 G/DL (ref 13–17.5)
MCH RBC QN AUTO: 25.6 PG (ref 26–34)
MCHC RBC AUTO-ENTMCNC: 30.4 G/DL (ref 31–37)
MCV RBC AUTO: 84.5 FL (ref 80–100)
OSMOLALITY SERPL CALC.SUM OF ELEC: 292 MOSM/KG (ref 275–295)
P AXIS: 66 DEGREES
P-R INTERVAL: 158 MS
PLATELET # BLD AUTO: 336 10(3)UL (ref 150–450)
POTASSIUM SERPL-SCNC: 3.9 MMOL/L (ref 3.5–5.1)
POTASSIUM SERPL-SCNC: 3.9 MMOL/L (ref 3.5–5.1)
Q-T INTERVAL: 298 MS
Q-T INTERVAL: 316 MS
QRS DURATION: 76 MS
QRS DURATION: 90 MS
QTC CALCULATION (BEZET): 397 MS
QTC CALCULATION (BEZET): 464 MS
R AXIS: 39 DEGREES
R AXIS: 62 DEGREES
RBC # BLD AUTO: 5.03 X10(6)UL (ref 4.3–5.7)
SODIUM SERPL-SCNC: 140 MMOL/L (ref 136–145)
T AXIS: 49 DEGREES
T AXIS: 62 DEGREES
VENTRICULAR RATE: 146 BPM
VENTRICULAR RATE: 95 BPM
WBC # BLD AUTO: 11.1 X10(3) UL (ref 4–11)

## 2025-05-12 PROCEDURE — 93306 TTE W/DOPPLER COMPLETE: CPT | Performed by: INTERNAL MEDICINE

## 2025-05-12 PROCEDURE — 94760 N-INVAS EAR/PLS OXIMETRY 1: CPT

## 2025-05-12 PROCEDURE — 93005 ELECTROCARDIOGRAM TRACING: CPT

## 2025-05-12 PROCEDURE — 85027 COMPLETE CBC AUTOMATED: CPT | Performed by: INTERNAL MEDICINE

## 2025-05-12 PROCEDURE — 80048 BASIC METABOLIC PNL TOTAL CA: CPT | Performed by: INTERNAL MEDICINE

## 2025-05-12 PROCEDURE — 94664 DEMO&/EVAL PT USE INHALER: CPT

## 2025-05-12 PROCEDURE — 93010 ELECTROCARDIOGRAM REPORT: CPT | Performed by: INTERNAL MEDICINE

## 2025-05-12 PROCEDURE — 82962 GLUCOSE BLOOD TEST: CPT

## 2025-05-12 PROCEDURE — 94640 AIRWAY INHALATION TREATMENT: CPT

## 2025-05-12 PROCEDURE — 94799 UNLISTED PULMONARY SVC/PX: CPT

## 2025-05-12 PROCEDURE — 84132 ASSAY OF SERUM POTASSIUM: CPT | Performed by: INTERNAL MEDICINE

## 2025-05-12 RX ORDER — DILTIAZEM HYDROCHLORIDE 30 MG/1
30 TABLET, FILM COATED ORAL EVERY 8 HOURS SCHEDULED
Status: DISCONTINUED | OUTPATIENT
Start: 2025-05-12 | End: 2025-05-13

## 2025-05-12 RX ORDER — DILTIAZEM HYDROCHLORIDE 5 MG/ML
10 INJECTION INTRAVENOUS ONCE
Status: COMPLETED | OUTPATIENT
Start: 2025-05-12 | End: 2025-05-12

## 2025-05-12 RX ORDER — ADENOSINE 3 MG/ML
6 INJECTION, SOLUTION INTRAVENOUS ONCE
Status: COMPLETED | OUTPATIENT
Start: 2025-05-12 | End: 2025-05-12

## 2025-05-12 RX ORDER — DILTIAZEM HYDROCHLORIDE 5 MG/ML
INJECTION INTRAVENOUS
Status: COMPLETED
Start: 2025-05-12 | End: 2025-05-12

## 2025-05-12 RX ORDER — PREDNISONE 20 MG/1
40 TABLET ORAL
Status: DISCONTINUED | OUTPATIENT
Start: 2025-05-13 | End: 2025-05-13

## 2025-05-12 RX ORDER — FLUTICASONE PROPIONATE AND SALMETEROL 250; 50 UG/1; UG/1
1 POWDER RESPIRATORY (INHALATION) 2 TIMES DAILY
Status: DISCONTINUED | OUTPATIENT
Start: 2025-05-12 | End: 2025-05-13

## 2025-05-12 NOTE — SIGNIFICANT EVENT
RRT    *See RRT Documentation Record*    Reason the RRT was called: HR sustaining in 140. Elevated blood pressure.  Assessment of patient leading up to RRT: HTN, HR sustaining >130.  Interventions/Testing: EKG, cardizem 10mg IV x2, adenosine 6 mg, cardizem gtt   Patient Outcome/Disposition: Stayed in unit, BP normalized, HR lower  Family Notified: no  Name of family notified: Pt declined

## 2025-05-12 NOTE — PROGRESS NOTES
RRT called to room 505    On my arrival patient lying in bed denying any complaints.  As per RN heart rate in the 130s up to 160s with minimal activity.  Patient earlier admitted with asthma exacerbation.  Denies any chest pain palpitations nausea vomiting or diaphoresis.  Denies any shortness of breath at this time.    On exam  Temperature 98.8  Pulse 148  Respiration 16  /100  Pulse ox 98% on room air  Alert oriented in no distress  Chest clear  Heart tachycardic  Abdomen soft  Extremities no edema    Assessment and plan    SVT  Attempted vagal maneuvers with no success, patient given adenosine again with no change.  This was followed with 20 mg of Cardizem, patient continued to remain tachycardic in the 140s.  Decision made to start Cardizem drip, titrate as needed.    35 minutes of critical care time spent with patient including coordinating care with ancillary staff

## 2025-05-12 NOTE — CONSULTS
Pulmonary Consult     Assessment / Plan:  Acute respiratory failure - due to asthma exacerbation  - supplemental O2 as needed  Asthma exacerbation  - IV to PO steroids tomorrow  - start Advair 250  - scheduled Duonebs  Dispo  - will follow    Dequan Altamirano MD  Pulmonary and Critical Care Medicine      History of Present Illness:   Mr. Riley is a 59 year old smoker with history of asthma who we are asked to see for dyspnea. Noted to have progressive dyspnea and wheezing for the last several days. No fever, chills, chest pain, cough, LE edema. He only has an albuterol inhaler at home. He does not have insurance.     12 point ROS negative except per HPI.    Past Medical History[1]    Past Surgical History[2]    Prescriptions Prior to Admission[3]  Medications Taking[4]   Allergies[5]   Short Social Hx on File[6]    Family History[7]      Exam:  Vitals:    05/12/25 0338 05/12/25 0410 05/12/25 0504 05/12/25 0623   BP: (!) 129/93 (!) 121/91 (!) 134/92    BP Location: Left arm Left arm Left arm    Pulse: 92 87 80 80   Resp:  20     Temp:  98.8 °F (37.1 °C)     TempSrc:  Oral     SpO2:  96%     Weight:         General: no apparent distress, conversant  Skin: no rash, ulcers or subcutaneous nodules  Eyes: anicteric sclerae, moist conjunctivae  Head, ears, nose, throat: atraumatic, oropharynx clear with moist mucous membranes  Neck: trachea midline with no thyromegaly  Heart: regular rate and rhythm, no murmurs / rubs / gallops  Lungs: bibasilar wheezing  Extremities: no edema or cyanosis  Psych: interactive, answering questions appropriately, appropriate affect    Labs:  Reviewed in EMR    Inpatient Medications:  Reviewed in EMR    Imaging:   Chest imaging reviewed         [1]   Past Medical History:   Diabetes (HCC)    Essential hypertension    Heroin abuse (HCC)   [2] History reviewed. No pertinent surgical history.  [3]   Medications Prior to Admission   Medication Sig Dispense Refill Last Dose/Taking    albuterol 108  (90 Base) MCG/ACT Inhalation Aero Soln Inhale 2 puffs into the lungs every 6 (six) hours as needed for Wheezing or Shortness of Breath.   5/11/2025 at  9:00 AM    atorvastatin 10 MG Oral Tab Take 1 tablet (10 mg total) by mouth daily.   5/11/2025 at  7:00 AM    ergocalciferol 1.25 MG (24308 UT) Oral Cap Take 1 capsule (50,000 Units total) by mouth every 7 days.   5/11/2025 at  7:00 AM    Multiple Vitamins-Minerals (MULTI-VITAMIN/MINERALS) Oral Tab Take 1 tablet by mouth daily.   5/11/2025 at  7:00 AM    NIFEdipine ER 30 MG Oral Tablet 24 Hr Take 1 tablet (30 mg total) by mouth every morning.   5/11/2025 at  7:00 AM    valsartan 320 MG Oral Tab Take 1 tablet (320 mg total) by mouth daily.   5/11/2025 at  7:00 AM    montelukast 10 MG Oral Tab Take 1 tablet (10 mg total) by mouth daily.   5/11/2025 at  7:00 AM    metFORMIN HCl 500 MG Oral Tab Take 1 tablet (500 mg total) by mouth 2 (two) times daily with meals.   5/11/2025 at  7:00 AM   [4]   Outpatient Medications Marked as Taking for the 5/11/25 encounter (Hospital Encounter)   Medication Sig Dispense Refill    albuterol 108 (90 Base) MCG/ACT Inhalation Aero Soln Inhale 2 puffs into the lungs every 6 (six) hours as needed for Wheezing or Shortness of Breath.      atorvastatin 10 MG Oral Tab Take 1 tablet (10 mg total) by mouth daily.      ergocalciferol 1.25 MG (48003 UT) Oral Cap Take 1 capsule (50,000 Units total) by mouth every 7 days.      Multiple Vitamins-Minerals (MULTI-VITAMIN/MINERALS) Oral Tab Take 1 tablet by mouth daily.      NIFEdipine ER 30 MG Oral Tablet 24 Hr Take 1 tablet (30 mg total) by mouth every morning.      valsartan 320 MG Oral Tab Take 1 tablet (320 mg total) by mouth daily.      montelukast 10 MG Oral Tab Take 1 tablet (10 mg total) by mouth daily.      metFORMIN HCl 500 MG Oral Tab Take 1 tablet (500 mg total) by mouth 2 (two) times daily with meals.     [5] No Known Allergies  [6]   Social History  Socioeconomic History    Marital status:  Single   Tobacco Use    Smoking status: Every Day     Current packs/day: 1.00     Types: Cigarettes    Smokeless tobacco: Never   Vaping Use    Vaping status: Never Used   Substance and Sexual Activity    Alcohol use: Never    Drug use: Never     Social Drivers of Health     Food Insecurity: No Food Insecurity (5/11/2025)    NCSS - Food Insecurity     Worried About Running Out of Food in the Last Year: No     Ran Out of Food in the Last Year: No   Transportation Needs: No Transportation Needs (5/11/2025)    NCSS - Transportation     Lack of Transportation: No   Housing Stability: Not At Risk (5/11/2025)    NCSS - Housing/Utilities     Has Housing: Yes     Worried About Losing Housing: No     Unable to Get Utilities: No   [7] No family history on file.

## 2025-05-12 NOTE — PLAN OF CARE
RRT overnight. See RRT note. On 4L NC. Was started on Cardizem drip, weaned and stopped at 0500. HR stable in 80's.     Problem: Patient Centered Care  Goal: Patient preferences are identified and integrated in the patient's plan of care  Description: Interventions:- What would you like us to know as we care for you? From home with mother. - Provide timely, complete, and accurate information to patient/family- Incorporate patient and family knowledge, values, beliefs, and cultural backgrounds into the planning and delivery of care- Encourage patient/family to participate in care and decision-making at the level they choose- Honor patient and family perspectives and choices  Outcome: Progressing     Problem: Diabetes/Glucose Control  Goal: Glucose maintained within prescribed range  Description: INTERVENTIONS:- Monitor Blood Glucose as ordered- Assess for signs and symptoms of hyperglycemia and hypoglycemia- Administer ordered medications to maintain glucose within target range- Assess barriers to adequate nutritional intake and initiate nutrition consult as needed- Instruct patient on self management of diabetes  INTERVENTIONS:- Monitor Blood Glucose as ordered- Assess for signs and symptoms of hyperglycemia and hypoglycemia- Administer ordered medications to maintain glucose within target range- Assess barriers to adequate nutritional intake and initiate nutrition consult as needed- Instruct patient on self management of diabetes  Outcome: Progressing     Problem: Patient/Family Goals  Goal: Patient/Family Long Term Goal  Description: Patient's Long Term Goal: Discharge home. Interventions:- Discharge planning. - See additional Care Plan goals for specific interventions  Outcome: Progressing  Goal: Patient/Family Short Term Goal  Description: Patient's Short Term Goal: Back to respiratory baseline. Interventions: - Plan of care, medications. - See additional Care Plan goals for specific interventions  Outcome:  Progressing     Problem: RESPIRATORY - ADULT  Goal: Achieves optimal ventilation and oxygenation  Description: INTERVENTIONS:- Assess for changes in respiratory status- Assess for changes in mentation and behavior- Position to facilitate oxygenation and minimize respiratory effort- Oxygen supplementation based on oxygen saturation or ABGs- Provide Smoking Cessation handout, if applicable- Encourage broncho-pulmonary hygiene including cough, deep breathe, Incentive Spirometry- Assess the need for suctioning and perform as needed- Assess and instruct to report SOB or any respiratory difficulty- Respiratory Therapy support as indicated- Manage/alleviate anxiety- Monitor for signs/symptoms of CO2 retention  Outcome: Progressing     Problem: METABOLIC/FLUID AND ELECTROLYTES - ADULT  Goal: Glucose maintained within prescribed range  Description: INTERVENTIONS:- Monitor Blood Glucose as ordered- Assess for signs and symptoms of hyperglycemia and hypoglycemia- Administer ordered medications to maintain glucose within target range- Assess barriers to adequate nutritional intake and initiate nutrition consult as needed- Instruct patient on self management of diabetes  INTERVENTIONS:- Monitor Blood Glucose as ordered- Assess for signs and symptoms of hyperglycemia and hypoglycemia- Administer ordered medications to maintain glucose within target range- Assess barriers to adequate nutritional intake and initiate nutrition consult as needed- Instruct patient on self management of diabetes  Outcome: Progressing  Goal: Electrolytes maintained within normal limits  Description: INTERVENTIONS:- Monitor labs and rhythm and assess patient for signs and symptoms of electrolyte imbalances- Administer electrolyte replacement as ordered- Monitor response to electrolyte replacements, including rhythm and repeat lab results as appropriate- Fluid restriction as ordered- Instruct patient on fluid and nutrition restrictions as  appropriate  Outcome: Progressing

## 2025-05-12 NOTE — PLAN OF CARE
Problem: Patient Centered Care  Goal: Patient preferences are identified and integrated in the patient's plan of care  Description: Interventions:- What would you like us to know as we care for you? From home with mother. - Provide timely, complete, and accurate information to patient/family- Incorporate patient and family knowledge, values, beliefs, and cultural backgrounds into the planning and delivery of care- Encourage patient/family to participate in care and decision-making at the level they choose- Honor patient and family perspectives and choices  Outcome: Progressing     Problem: Diabetes/Glucose Control  Goal: Glucose maintained within prescribed range  Description: INTERVENTIONS:- Monitor Blood Glucose as ordered- Assess for signs and symptoms of hyperglycemia and hypoglycemia- Administer ordered medications to maintain glucose within target range- Assess barriers to adequate nutritional intake and initiate nutrition consult as needed- Instruct patient on self management of diabetes  INTERVENTIONS:- Monitor Blood Glucose as ordered- Assess for signs and symptoms of hyperglycemia and hypoglycemia- Administer ordered medications to maintain glucose within target range- Assess barriers to adequate nutritional intake and initiate nutrition consult as needed- Instruct patient on self management of diabetes  Outcome: Progressing     Problem: Patient/Family Goals  Goal: Patient/Family Long Term Goal  Description: Patient's Long Term Goal: Discharge home. Interventions:- Discharge planning. - See additional Care Plan goals for specific interventions  Outcome: Progressing  Goal: Patient/Family Short Term Goal  Description: Patient's Short Term Goal: Back to respiratory baseline. Interventions: - Plan of care, medications. - See additional Care Plan goals for specific interventions  Outcome: Progressing     Problem: RESPIRATORY - ADULT  Goal: Achieves optimal ventilation and oxygenation  Description:  INTERVENTIONS:- Assess for changes in respiratory status- Assess for changes in mentation and behavior- Position to facilitate oxygenation and minimize respiratory effort- Oxygen supplementation based on oxygen saturation or ABGs- Provide Smoking Cessation handout, if applicable- Encourage broncho-pulmonary hygiene including cough, deep breathe, Incentive Spirometry- Assess the need for suctioning and perform as needed- Assess and instruct to report SOB or any respiratory difficulty- Respiratory Therapy support as indicated- Manage/alleviate anxiety- Monitor for signs/symptoms of CO2 retention  Outcome: Progressing     Problem: METABOLIC/FLUID AND ELECTROLYTES - ADULT  Goal: Glucose maintained within prescribed range  Description: INTERVENTIONS:- Monitor Blood Glucose as ordered- Assess for signs and symptoms of hyperglycemia and hypoglycemia- Administer ordered medications to maintain glucose within target range- Assess barriers to adequate nutritional intake and initiate nutrition consult as needed- Instruct patient on self management of diabetes  INTERVENTIONS:- Monitor Blood Glucose as ordered- Assess for signs and symptoms of hyperglycemia and hypoglycemia- Administer ordered medications to maintain glucose within target range- Assess barriers to adequate nutritional intake and initiate nutrition consult as needed- Instruct patient on self management of diabetes  Outcome: Progressing  Goal: Electrolytes maintained within normal limits  Description: INTERVENTIONS:- Monitor labs and rhythm and assess patient for signs and symptoms of electrolyte imbalances- Administer electrolyte replacement as ordered- Monitor response to electrolyte replacements, including rhythm and repeat lab results as appropriate- Fluid restriction as ordered- Instruct patient on fluid and nutrition restrictions as appropriate  Outcome: Progressing

## 2025-05-12 NOTE — PROGRESS NOTES
[facility]   INTERNAL MEDICINE PROGRESS NOTE  Subjective:   CHIEF COMPLAINT   Difficulty Breathing    SUBJECTIVE  24 HR EVENTS   Afib w RVR overnight - RRT called  Started on dilt, dilt gtt. Rates better this AM    INPATIENT MEDICATIONS  Scheduled Meds[1]  IV Meds[2]  PRN Meds[3]     Objective:    PHYSICAL EXAMINATION   Vitals: BP (!) 146/98 (BP Location: Left arm)   Pulse 82   Temp 97.8 °F (36.6 °C) (Oral)   Resp 20   Wt 220 lb 11.2 oz (100.1 kg)   SpO2 95%   BMI 31.67 kg/m²   Gen: NAD, well nourished  Eyes: PERRLA, normal conjunctivae  ENMT: Moist mucous membranes, trachea midline  CV: RRR, no M/R/G, no peripheral edema  Resp: wheezy, non-labored respirations, symmetric expansion  GI: Soft, NT, ND, + BS, no rebound, no guarding  MSK:  No C/C/E, normal active/passive ROM in C-spine  Skin: No rashes, visible skin w/o worrisome lesions   Neuro: CN 2-12 grossly intact, sensation intact   Psych: A&Ox3, appropriate mood, conversant w/ linear thought process     DATA REVIEW: LABORATORY VALUES & DIAGNOSTICS  Recent Labs   Lab 05/11/25  1254 05/12/25  0447    140   K 3.7 3.9  3.9    108   CO2 32.0 31.0   BUN 9 13   CREATSERUM 0.76 0.71   ANIONGAP 5 1   GLU 84 126*   CA 8.9 8.7   TP 7.4  --    ALB 4.5  --    AST 31  --    ALT 38  --    ALKPHO 90  --    BILT 0.5  --    EGFRCR 105 107     Recent Labs   Lab 05/11/25  1254 05/12/25  0447   WBC 6.3 11.1*   HGB 13.2 12.9*   HCT 43.3 42.5   .0 336.0   MCV 84.4 84.5   MOPERCENT 11.9  --    EOPERCENT 0.8  --    BAPERCENT 0.5  --          Assessment & Plan:    Heladio Riley - 59 year old male admitted 5/11/2025 for dyspnea, presumed asthma exac.    Acute exacerbation of asthma  Acute hypoxic resp failure   - 5L o2 in ED to maintain SpO2. CXR w/o acute process   - Duonebs, steroids, IS/flutter valve airway clearance, RT eval, PTA MDI  - pulm consulted  - Supplemental O2 for goal SpO2 90-93%  - No obvious focal pneumonias - hold abox for now    New  dx parox a fib w RVR  [  ] Echo  - IV dilt gtt. Will add PO dilt  [  ] Will need to discuss AC    HTN  HLD  Resume other home meds as able    ACP: full  Ppx: DVT: Enox  Dispo  EDoD:  ~ 5/13 - 5/14    F/u:   - PCP:  PHYSICIAN NONSTAFF  - Cardiology    Available via epic secure chat or perfect serve 7A - 7P.    Raj Garcia MD   Internal Medicine - [facility]ist  [facility]       [1] fluticasone-salmeterol, 1 puff, BID  [START ON 5/13/2025] predniSONE, 40 mg, Daily with breakfast  valsartan, 320 mg, Daily  NIFEdipine ER, 30 mg, QAM  atorvastatin, 10 mg, Daily  enoxaparin, 40 mg, Daily  methylPREDNISolone, 40 mg, Q8H  insulin aspart, 1-5 Units, TID CC  ipratropium-albuterol, 3 mL, QID  [2]    [3] acetaminophen, 500 mg, Q4H PRN  melatonin, 3 mg, Nightly PRN  polyethylene glycol (PEG 3350), 17 g, Daily PRN  sennosides, 17.2 mg, Nightly PRN  guaiFENesin, 200 mg, Q4H PRN  ondansetron, 4 mg, Q6H PRN  prochlorperazine, 5 mg, Q8H PRN  glucose, 15 g, Q15 Min PRN   Or  glucose, 15 g, Q15 Min PRN   Or  glucose-vitamin C, 4 tablet, Q15 Min PRN   Or  dextrose, 50 mL, Q15 Min PRN   Or  glucose, 30 g, Q15 Min PRN   Or  glucose, 30 g, Q15 Min PRN   Or  glucose-vitamin C, 8 tablet, Q15 Min PRN

## 2025-05-13 VITALS
WEIGHT: 220.69 LBS | DIASTOLIC BLOOD PRESSURE: 99 MMHG | RESPIRATION RATE: 18 BRPM | TEMPERATURE: 98 F | BODY MASS INDEX: 32 KG/M2 | HEART RATE: 106 BPM | OXYGEN SATURATION: 94 % | SYSTOLIC BLOOD PRESSURE: 151 MMHG

## 2025-05-13 LAB
ANION GAP SERPL CALC-SCNC: 6 MMOL/L (ref 0–18)
BUN BLD-MCNC: 13 MG/DL (ref 9–23)
BUN/CREAT SERPL: 18.8 (ref 10–20)
CALCIUM BLD-MCNC: 9.1 MG/DL (ref 8.7–10.4)
CHLORIDE SERPL-SCNC: 106 MMOL/L (ref 98–112)
CO2 SERPL-SCNC: 31 MMOL/L (ref 21–32)
CREAT BLD-MCNC: 0.69 MG/DL (ref 0.7–1.3)
DEPRECATED RDW RBC AUTO: 49.2 FL (ref 35.1–46.3)
EGFRCR SERPLBLD CKD-EPI 2021: 108 ML/MIN/1.73M2 (ref 60–?)
ERYTHROCYTE [DISTWIDTH] IN BLOOD BY AUTOMATED COUNT: 16 % (ref 11–15)
GLUCOSE BLD-MCNC: 114 MG/DL (ref 70–99)
GLUCOSE BLDC GLUCOMTR-MCNC: 120 MG/DL (ref 70–99)
GLUCOSE BLDC GLUCOMTR-MCNC: 145 MG/DL (ref 70–99)
HCT VFR BLD AUTO: 42.4 % (ref 39–53)
HGB BLD-MCNC: 12.8 G/DL (ref 13–17.5)
MCH RBC QN AUTO: 25.7 PG (ref 26–34)
MCHC RBC AUTO-ENTMCNC: 30.2 G/DL (ref 31–37)
MCV RBC AUTO: 85 FL (ref 80–100)
OSMOLALITY SERPL CALC.SUM OF ELEC: 297 MOSM/KG (ref 275–295)
PLATELET # BLD AUTO: 377 10(3)UL (ref 150–450)
POTASSIUM SERPL-SCNC: 3.5 MMOL/L (ref 3.5–5.1)
RBC # BLD AUTO: 4.99 X10(6)UL (ref 4.3–5.7)
SODIUM SERPL-SCNC: 143 MMOL/L (ref 136–145)
WBC # BLD AUTO: 11.3 X10(3) UL (ref 4–11)

## 2025-05-13 PROCEDURE — 94799 UNLISTED PULMONARY SVC/PX: CPT

## 2025-05-13 PROCEDURE — 85027 COMPLETE CBC AUTOMATED: CPT | Performed by: INTERNAL MEDICINE

## 2025-05-13 PROCEDURE — 94640 AIRWAY INHALATION TREATMENT: CPT

## 2025-05-13 PROCEDURE — 80048 BASIC METABOLIC PNL TOTAL CA: CPT | Performed by: INTERNAL MEDICINE

## 2025-05-13 PROCEDURE — 94760 N-INVAS EAR/PLS OXIMETRY 1: CPT

## 2025-05-13 PROCEDURE — 82962 GLUCOSE BLOOD TEST: CPT

## 2025-05-13 RX ORDER — FLUTICASONE PROPIONATE AND SALMETEROL 250; 50 UG/1; UG/1
1 POWDER RESPIRATORY (INHALATION) 2 TIMES DAILY
Qty: 1 EACH | Refills: 0 | Status: SHIPPED | OUTPATIENT
Start: 2025-05-13

## 2025-05-13 RX ORDER — POTASSIUM CHLORIDE 1500 MG/1
40 TABLET, EXTENDED RELEASE ORAL EVERY 4 HOURS
Status: COMPLETED | OUTPATIENT
Start: 2025-05-13 | End: 2025-05-13

## 2025-05-13 RX ORDER — PREDNISONE 20 MG/1
40 TABLET ORAL
Qty: 10 TABLET | Refills: 0 | Status: SHIPPED | OUTPATIENT
Start: 2025-05-14 | End: 2025-05-19

## 2025-05-13 RX ORDER — DILTIAZEM HYDROCHLORIDE 240 MG/1
240 CAPSULE, COATED, EXTENDED RELEASE ORAL DAILY
Qty: 30 CAPSULE | Refills: 0 | Status: SHIPPED | OUTPATIENT
Start: 2025-05-13

## 2025-05-13 NOTE — DISCHARGE INSTRUCTIONS
General Medical Follow up:  [employer] (healthcare clinic) [email or web address]  [facility] welcomes patients with Medicaid, Medicare, private insurance or no insurance at all, possibly at a discounted rate. [facility] offers low cost prescriptions drugs when seen by a [facility] physician.   [facility] [address], [place] [id number] [phone]  [facility] [place] [address] [facility] or [address] [facility] [phone]  [facility] [address], [place] or [address] [place], [place]  [phone]  [facility] - Primary Care/Onsite Pharmacy [address] [place] [address] [phone]  [facility] [address], [place] [address] [phone]  [facility] [address] [facility] [place] [address] [phone]  [place] [address] [facility], [address] [place] [address]  Jose Armando goodrich john aqui o llame al teléfono [phone] o al [phone].     [facility] provides care for chronic disease management, and offers coordinated, patient-centered care.  Their services include adult health, pediatric health, women's health, dental services, behavioral health services and LGBTQ+ health.  [facility] works to eliminate any barriers that may keep our patients from accessing services. We do this by providing 24-hour access to providers, comprehensive care management, transportation assistance, access to reduced-price pharmaceuticals, on-site laboratories, same and next-day appointments, bilingual staff and translation services.    [facility]- [address] [facility]- [address] [facility], [address], [facility]- [address] [facility]- 373 [facility]- 450 [facility]-[address] [facility], [address], [place],  [facility]- [address] [facility]- [address] [place], [place]   [place]- [address], [facility]- [address], [facility]- [address], [place], [place]     [email or web address]  Main number [phone]    Visit [employer] for discounted prescription drug coupons or [employer] for $4 prescriptions https://www.SendtoNews/cp/4-dollar-prescriptions/3219861    To inquire about [place] Medicaid, call [facility] [phone] or visit [email or web address]/[email or web address]/access/    Healthcare.gov - Marketplace insurance    The Extra Help Program: is designed to help eligible Medicare Part D patients with high out of pocket costs. Contact this program directly by calling [employer] at 1-474.901.6277    Through the Medicare Plan Finder, you can search to find more information about cost and your specific prescription coverage.  www.medicare.gov/find-a-plan.    Community Health Care Program (only apply if you do not Qualify for medicaid)  [facility]  [address]; [address]  [place]  Phone: [phone]  [email or web address]    [facility]  [address] [place] [address]  [phone]  [email or web address]    Questions about financial assistance, application, or uninsured discounts can be directed to [phone]     [place] [address]  [facility]  [address] [place] [address]  Phone: [phone]  Fax: [phone]

## 2025-05-13 NOTE — PLAN OF CARE
Problem: Patient Centered Care  Goal: Patient preferences are identified and integrated in the patient's plan of care  Description: Interventions:- What would you like us to know as we care for you? From home with mother. - Provide timely, complete, and accurate information to patient/family- Incorporate patient and family knowledge, values, beliefs, and cultural backgrounds into the planning and delivery of care- Encourage patient/family to participate in care and decision-making at the level they choose- Honor patient and family perspectives and choices  Outcome: Progressing     Problem: Diabetes/Glucose Control  Goal: Glucose maintained within prescribed range  Description: INTERVENTIONS:- Monitor Blood Glucose as ordered- Assess for signs and symptoms of hyperglycemia and hypoglycemia- Administer ordered medications to maintain glucose within target range- Assess barriers to adequate nutritional intake and initiate nutrition consult as needed- Instruct patient on self management of diabetes  INTERVENTIONS:- Monitor Blood Glucose as ordered- Assess for signs and symptoms of hyperglycemia and hypoglycemia- Administer ordered medications to maintain glucose within target range- Assess barriers to adequate nutritional intake and initiate nutrition consult as needed- Instruct patient on self management of diabetes  Outcome: Progressing     Problem: Patient/Family Goals  Goal: Patient/Family Long Term Goal  Description: Patient's Long Term Goal: Discharge home. Interventions:- Discharge planning. - See additional Care Plan goals for specific interventions  Outcome: Progressing  Goal: Patient/Family Short Term Goal  Description: Patient's Short Term Goal: Back to respiratory baseline. Interventions: - Plan of care, medications. - See additional Care Plan goals for specific interventions  Outcome: Progressing     Problem: RESPIRATORY - ADULT  Goal: Achieves optimal ventilation and oxygenation  Description:  INTERVENTIONS:- Assess for changes in respiratory status- Assess for changes in mentation and behavior- Position to facilitate oxygenation and minimize respiratory effort- Oxygen supplementation based on oxygen saturation or ABGs- Provide Smoking Cessation handout, if applicable- Encourage broncho-pulmonary hygiene including cough, deep breathe, Incentive Spirometry- Assess the need for suctioning and perform as needed- Assess and instruct to report SOB or any respiratory difficulty- Respiratory Therapy support as indicated- Manage/alleviate anxiety- Monitor for signs/symptoms of CO2 retention  Outcome: Progressing     Problem: METABOLIC/FLUID AND ELECTROLYTES - ADULT  Goal: Glucose maintained within prescribed range  Description: INTERVENTIONS:- Monitor Blood Glucose as ordered- Assess for signs and symptoms of hyperglycemia and hypoglycemia- Administer ordered medications to maintain glucose within target range- Assess barriers to adequate nutritional intake and initiate nutrition consult as needed- Instruct patient on self management of diabetes  INTERVENTIONS:- Monitor Blood Glucose as ordered- Assess for signs and symptoms of hyperglycemia and hypoglycemia- Administer ordered medications to maintain glucose within target range- Assess barriers to adequate nutritional intake and initiate nutrition consult as needed- Instruct patient on self management of diabetes  Outcome: Progressing  Goal: Electrolytes maintained within normal limits  Description: INTERVENTIONS:- Monitor labs and rhythm and assess patient for signs and symptoms of electrolyte imbalances- Administer electrolyte replacement as ordered- Monitor response to electrolyte replacements, including rhythm and repeat lab results as appropriate- Fluid restriction as ordered- Instruct patient on fluid and nutrition restrictions as appropriate  Outcome: Progressing    Monitoring vital signs, stable at this time. No acute changes at this moment.  Monitoring  blood glucose levels.  Pain medication provided as needed. Fall precautions in place- bed alarm on, bed locked in lowest position, call light within reach. Frequent rounding by nursing staff.

## 2025-05-13 NOTE — PROGRESS NOTES
Pulmonary Progress Note     Assessment / Plan:  Acute respiratory failure - due to asthma exacerbation  - supplemental O2 as needed  Asthma exacerbation  - IV to PO steroids today  - start Advair 250  - scheduled Duonebs  Dispo  - desat screen. If weaned off supplemental O2 he can be discharged from my perspective. Follow-up in one week with MD or APN.       Subjective:  No complaints. Denies dyspnea, wheezing, cough. Wants to go home.     Objective:  Vitals:    05/12/25 2055 05/12/25 2341 05/13/25 0455 05/13/25 0838   BP: 131/82 136/84 (!) 137/92 (!) 150/93   BP Location: Left arm Left arm Left arm Right arm   Pulse: 90 79 83 104   Resp: 17 18 17 16   Temp: 98.6 °F (37 °C) 98.4 °F (36.9 °C) 98.3 °F (36.8 °C) 97.9 °F (36.6 °C)   TempSrc: Oral Axillary Oral Oral   SpO2: 95% 98% 94% 92%   Weight:         Physical Exam:  General: no apparent distress, conversant  Skin: no rash, ulcers or subcutaneous nodules  Eyes: anicteric sclerae, moist conjunctivae  Head, ears, nose, throat: atraumatic, oropharynx clear with moist mucous membranes  Neck: trachea midline with no thyromegaly  Heart: regular rate and rhythm, no murmurs / rubs / gallops  Lungs: clear bilaterally, normal respiratory effort, no accessory muscle use  Extremities: no edema or cyanosis  Psych: interactive, answering questions appropriately, appropriate affect    Medications:  Reviewed in EMR    Lab Data:  Reviewed in EMR    Imaging:  I independently visualized all relevant chest imaging in PACS and agree with radiology interpretation except where noted.

## 2025-05-13 NOTE — PROGRESS NOTES
[facility]   INTERNAL MEDICINE PROGRESS NOTE  Subjective:   CHIEF COMPLAINT   Difficulty Breathing    SUBJECTIVE  24 HR EVENTS   SR since rrt  Started on dilt, dilt gtt. Rates better this AM    INPATIENT MEDICATIONS  Scheduled Meds[1]  IV Meds[2]  PRN Meds[3]     Objective:    PHYSICAL EXAMINATION   Vitals: BP (!) 150/93 (BP Location: Right arm)   Pulse 104   Temp 97.9 °F (36.6 °C) (Oral)   Resp 16   Wt 220 lb 11.2 oz (100.1 kg)   SpO2 92%   BMI 31.67 kg/m²   Gen: NAD, well nourished  Eyes: PERRLA, normal conjunctivae  ENMT: Moist mucous membranes, trachea midline  CV: RRR, no M/R/G, no peripheral edema  Resp: wheezy, non-labored respirations, symmetric expansion  GI: Soft, NT, ND, + BS, no rebound, no guarding  MSK:  No C/C/E, normal active/passive ROM in C-spine  Skin: No rashes, visible skin w/o worrisome lesions   Neuro: CN 2-12 grossly intact, sensation intact   Psych: A&Ox3, appropriate mood, conversant w/ linear thought process     DATA REVIEW: LABORATORY VALUES & DIAGNOSTICS  Recent Labs   Lab 05/11/25  1254 05/12/25 0447 05/13/25  0537    140 143   K 3.7 3.9  3.9 3.5    108 106   CO2 32.0 31.0 31.0   BUN 9 13 13   CREATSERUM 0.76 0.71 0.69*   ANIONGAP 5 1 6   GLU 84 126* 114*   CA 8.9 8.7 9.1   TP 7.4  --   --    ALB 4.5  --   --    AST 31  --   --    ALT 38  --   --    ALKPHO 90  --   --    BILT 0.5  --   --    EGFRCR 105 107 108     Recent Labs   Lab 05/11/25  1254 05/12/25 0447 05/13/25  0537   WBC 6.3 11.1* 11.3*   HGB 13.2 12.9* 12.8*   HCT 43.3 42.5 42.4   .0 336.0 377.0   MCV 84.4 84.5 85.0   MOPERCENT 11.9  --   --    EOPERCENT 0.8  --   --    BAPERCENT 0.5  --   --          Assessment & Plan:    Heladio Riley - 59 year old male admitted 5/11/2025 for dyspnea, presumed asthma exac.    Acute exacerbation of asthma  Acute hypoxic resp failure   - 5L o2 in ED to maintain SpO2. CXR w/o acute process   - Duonebs, steroids, IS/flutter valve airway clearance, RT  JEAN MARIE neff MDI  - pulm consulted  - Supplemental O2 for goal SpO2 90-93%  - No obvious focal pneumonias - hold abox for now  - exercise oximetry:      05/13/25 1159   Mobility   O2 walk? Yes   SPO2% on Room Air at Rest 93   SPO2% on Oxygen at Rest 94   At rest oxygen flow (liters per minute) 2   SPO2% Ambulation on Room Air 86   SPO2% Ambulation on Oxygen 90   Ambulation oxygen flow (liters per minute) 2               I had a face to face visit with this Heladio Riley and I evaluated the patient's O2 saturations/walking O2 study completed by nursing 5/13/2025.  The patient is mobile in their home and is in need of a portable oxygen tank.  The home oxygen will improve Heladio Riley's condition.  Patient requires 2L O2 with ambulation.    New dx parox a fib w RVR  - Echo okay  - PO dilt  CV 2 - discusssed AC. Agreeable - will start eliquis. Will need cardiology f/u    DM2   - Hold PTA oral meds. SSI. Accuchecks w/ meals. Hypoglycemia protocol      HTN  HLD  Resume other home meds as able    ACP: full  Ppx: DVT: Enox  Dispo  EDoD:  ~ 5/13 - 5/14.  Pt strongly prefers DC today, still wheezy. Mom at bedside hesitant. No objections to DC but might benefit from additional night    F/u:   - PCP:  PHYSICIAN NONSTAFF  - Cardiology  - Pulm    Available via epic secure chat or perfect serve 7A - 7P.    Raj Garcia MD   Internal Medicine - [place] [facility]ist  [facility]         [1] potassium chloride, 40 mEq, Q4H  fluticasone-salmeterol, 1 puff, BID  predniSONE, 40 mg, Daily with breakfast  dilTIAZem, 30 mg, Q8H PETERSON  valsartan, 320 mg, Daily  atorvastatin, 10 mg, Daily  enoxaparin, 40 mg, Daily  insulin aspart, 1-5 Units, TID CC  ipratropium-albuterol, 3 mL, QID     [2]    [3] acetaminophen, 500 mg, Q4H PRN  melatonin, 3 mg, Nightly PRN  polyethylene glycol (PEG 3350), 17 g, Daily PRN  sennosides, 17.2 mg, Nightly PRN  guaiFENesin, 200 mg, Q4H PRN  ondansetron, 4 mg, Q6H PRN  prochlorperazine, 5 mg, Q8H PRN  glucose, 15 g,  Q15 Min PRN   Or  glucose, 15 g, Q15 Min PRN   Or  glucose-vitamin C, 4 tablet, Q15 Min PRN   Or  dextrose, 50 mL, Q15 Min PRN   Or  glucose, 30 g, Q15 Min PRN   Or  glucose, 30 g, Q15 Min PRN   Or  glucose-vitamin C, 8 tablet, Q15 Min PRN

## 2025-05-13 NOTE — CM/SW NOTE
Oxygen:  I had a face to face visit with Heladio Riley and I reviewed the patient's walking O2 study completed by nursing 5/13/25. The patient is mobile in their home and is in need of a portable oxygen tank. The home oxygen will improve the Heladio Riley's condition.

## 2025-05-13 NOTE — PROGRESS NOTES
05/13/25 1159   Mobility   O2 walk? Yes   SPO2% on Room Air at Rest 93   SPO2% on Oxygen at Rest 94   At rest oxygen flow (liters per minute) 2   SPO2% Ambulation on Room Air 86   SPO2% Ambulation on Oxygen 90   Ambulation oxygen flow (liters per minute) 2

## 2025-05-13 NOTE — CM/SW NOTE
05/13/25 1500   Discharge disposition   Expected discharge disposition Home or Self   DME/Infusion Providers [facility]   Discharge transportation Private car     Marisela PEDRON RN CRRMIGUE ROSA  RN Case Manager  [phone]

## 2025-05-13 NOTE — CM/SW NOTE
Helaido screened and does not qualify for MELA per the [employer] rep.    Eliquis free 30 days filled by pharmacy OP at [place].    O2 order and verbiage sent to [facility].  Patient paid by credit card for home O2 and a tank will be delivered to bedside.    CM met with patient and shared resources on good RX coupon, [facility] clinic in [place] for PCP needs and [employer] for insurance needs.    [facility] to deliver tank to room, Eliquis will be delivered by OP pharmacy.    Marisela Tian MBA BSN RN CRRMIGUE ROSA  RN Case Manager  [phone]

## 2025-05-13 NOTE — CM/SW NOTE
General Medical Follow up:  [employer] (healthcare clinic) [email or web address]  Critical access hospital welcomes patients with Medicaid, Medicare, private insurance or no insurance at all, possibly at a discounted rate. [facility] offers low cost prescriptions drugs when seen by a Critical access hospital physician.   [facility] [address], [place] [address] [phone]  [facility] [address] [facility] or [address] [facility] [phone]  [facility] [address] [place], [place] or [address] [place], [place]  [phone]  [facility] - Primary Care/Onsite Pharmacy [address] [place] [address] [phone]  [facility] [address], [place] [address] [phone]  [facility] [address] [facility] [place] [address] [phone]  [place] [address] [facility], [address] [place] [address]  Jose Armando goodrich john aqui o llame al teléfono [phone] o al [phone].     [facility] provides care for chronic disease management, and offers coordinated, patient-centered care.  Their services include adult health, pediatric health, women's health, dental services, behavioral health services and LGBTQ+ health.  [facility] works to eliminate any barriers that may keep our patients from accessing services. We do this by providing 24-hour access to providers, comprehensive care management, transportation assistance, access to reduced-price pharmaceuticals, on-site laboratories, same and next-day appointments, bilingual staff and translation services.    [facility]- [address] [facility]- [address] [facility], [address], [facility]- [address] [facility]- 373 [facility]- [address] [facility]-[address] [facility], [address], [place],  [place]   GF- [address] [facility]- [address] [place], [place]   GF- [address], [facility]- [address], [facility]- [address], [place], [place]     [email or web address]  Main number [phone]    Visit [employer] for discounted prescription drug coupons or [employer] for $4 prescriptions https://www.Al[employer]Rx Pharmaceuticals/cp/4-dollar-prescriptions/3492943    To inquire about [place] Medicaid, call [facility] [phone] or visit [email or web address]    Healthcare.gov - Marketplace insurance    The Extra Help Program: is designed to help eligible Medicare Part D patients with high out of pocket costs. Contact this program directly by calling [employer] at [phone]    Through the Medicare Plan Finder, you can search to find more information about cost and your specific prescription coverage.  www.medicare.gov/find-a-plan.    [facility] Health Care Program (only apply if you do not Qualify for medicaid)  [facility]  [address] [facility]; [address]  [place]  Phone: [phone]  [email or web address] [email or web address] [email or web address]    [facility]  [address] [place] [address]  [phone]  [email or web address]    Questions about financial assistance, application, or uninsured discounts can be directed to [phone]

## 2025-05-13 NOTE — PLAN OF CARE
A&Ox4. Pt ambulates self, monitoring blood glucose levels per order- ACHS, voiding up to bathroom, tolerating diet, on 2L via NC.  Reinforced education on home oxygen use, smoking cessation, and discharge instructions with emphasis on following up with PCP and pulmonology. Frequent rounding by nursing staff. Safety precautions maintained/call light within reach.   Patient discharged to home with O2 tank and nasal cannula. IV removed, discharge education provided, patient sent with all personal belongings, medications- eliquis, and discharge instructions. Addressed additional questions.     Problem: Patient Centered Care  Goal: Patient preferences are identified and integrated in the patient's plan of care  Description: Interventions:- What would you like us to know as we care for you? From home with mother. - Provide timely, complete, and accurate information to patient/family- Incorporate patient and family knowledge, values, beliefs, and cultural backgrounds into the planning and delivery of care- Encourage patient/family to participate in care and decision-making at the level they choose- Honor patient and family perspectives and choices  Outcome: Adequate for Discharge     Problem: Diabetes/Glucose Control  Goal: Glucose maintained within prescribed range  Description: INTERVENTIONS:- Monitor Blood Glucose as ordered- Assess for signs and symptoms of hyperglycemia and hypoglycemia- Administer ordered medications to maintain glucose within target range- Assess barriers to adequate nutritional intake and initiate nutrition consult as needed- Instruct patient on self management of diabetes  INTERVENTIONS:- Monitor Blood Glucose as ordered- Assess for signs and symptoms of hyperglycemia and hypoglycemia- Administer ordered medications to maintain glucose within target range- Assess barriers to adequate nutritional intake and initiate nutrition consult as needed- Instruct patient on self management of  diabetes  Outcome: Adequate for Discharge     Problem: Patient/Family Goals  Goal: Patient/Family Long Term Goal  Description: Patient's Long Term Goal: Discharge home. Interventions:- Discharge planning. - See additional Care Plan goals for specific interventions  Outcome: Adequate for Discharge  Goal: Patient/Family Short Term Goal  Description: Patient's Short Term Goal: Back to respiratory baseline. Interventions: - Plan of care, medications. - See additional Care Plan goals for specific interventions  Outcome: Adequate for Discharge     Problem: RESPIRATORY - ADULT  Goal: Achieves optimal ventilation and oxygenation  Description: INTERVENTIONS:- Assess for changes in respiratory status- Assess for changes in mentation and behavior- Position to facilitate oxygenation and minimize respiratory effort- Oxygen supplementation based on oxygen saturation or ABGs- Provide Smoking Cessation handout, if applicable- Encourage broncho-pulmonary hygiene including cough, deep breathe, Incentive Spirometry- Assess the need for suctioning and perform as needed- Assess and instruct to report SOB or any respiratory difficulty- Respiratory Therapy support as indicated- Manage/alleviate anxiety- Monitor for signs/symptoms of CO2 retention  Outcome: Adequate for Discharge     Problem: METABOLIC/FLUID AND ELECTROLYTES - ADULT  Goal: Glucose maintained within prescribed range  Description: INTERVENTIONS:- Monitor Blood Glucose as ordered- Assess for signs and symptoms of hyperglycemia and hypoglycemia- Administer ordered medications to maintain glucose within target range- Assess barriers to adequate nutritional intake and initiate nutrition consult as needed- Instruct patient on self management of diabetes  INTERVENTIONS:- Monitor Blood Glucose as ordered- Assess for signs and symptoms of hyperglycemia and hypoglycemia- Administer ordered medications to maintain glucose within target range- Assess barriers to adequate nutritional  intake and initiate nutrition consult as needed- Instruct patient on self management of diabetes  Outcome: Adequate for Discharge  Goal: Electrolytes maintained within normal limits  Description: INTERVENTIONS:- Monitor labs and rhythm and assess patient for signs and symptoms of electrolyte imbalances- Administer electrolyte replacement as ordered- Monitor response to electrolyte replacements, including rhythm and repeat lab results as appropriate- Fluid restriction as ordered- Instruct patient on fluid and nutrition restrictions as appropriate  Outcome: Adequate for Discharge

## 2025-05-14 NOTE — DISCHARGE SUMMARY
[facility]   INTERNAL MEDICINE DISCHARGE SUMMARY    Name:     Heladio Riley  YOB: 1965  MRN:     [id number]    Admission Date:     5/11/2025 Discharge Date:     05/13/25      REASON FOR ADMISSION  Acute hypoxemic respiratory failure (HCC)    Primary care physician: PHYSICIAN NONSTAFF   Discharging physician: Raj Garcia MD     HOSPITAL COURSE  DISCHARGE DIAGNOSES  PROCEDURES   Heladio Riley - 59 year old male admitted 5/11/2025 for dyspnea, presumed asthma exac.     Acute exacerbation of asthma  Acute hypoxic resp failure   - 5L o2 in ED to maintain SpO2. CXR w/o acute process   - Duonebs, steroids, IS/flutter valve airway clearance, RT eval, PTA MDI  - pulm consulted  - Supplemental O2 for goal SpO2 90-93%  - No obvious focal pneumonias - hold abox for now  - exercise oximetry:       05/13/25 1159   Mobility   O2 walk? Yes   SPO2% on Room Air at Rest 93   SPO2% on Oxygen at Rest 94   At rest oxygen flow (liters per minute) 2   SPO2% Ambulation on Room Air 86   SPO2% Ambulation on Oxygen 90   Ambulation oxygen flow (liters per minute) 2                I had a face to face visit with this Heladio Riley and I evaluated the patient's O2 saturations/walking O2 study completed by nursing 5/13/2025.  The patient is mobile in their home and is in need of a portable oxygen tank.  The home oxygen will improve Heladio Riley's condition.  Patient requires 2L O2 with ambulation.     New dx parox a fib w RVR  - Echo okay  - PO dilt  CV 2 - discusssed AC. Agreeable - will start eliquis. Will need cardiology f/u     DM2   - Hold PTA oral meds. SSI. Accuchecks w/ meals. Hypoglycemia protocol        HTN  HLD  Resume other home meds as able      Will need to arrange f/u care - currently self pay/medicaid pending. [place] [facility] [place] [facility]or [facility]  Was able to arrange 1mo eliquis but will need refills    CONSULTATIONS   IP CONSULT TO PULMONOLOGY  CONSULT  - HOME  HEALTH    IMPORTANT FOLLOW UP  Dequan Altamirano MD  [address]  [address]  [facility] [id number] [phone]    Follow up in 1 week(s)      Primary care doctor    Schedule an appointment as soon as possible for a visit in 1 week(s)  Hospital follow-up     Discharge Instructions           General Medical Follow up:  [facility] (healthcare clinic) [email or web address]  [facility] welcomes patients with Medicaid, Medicare, private insurance or no insurance at all, possibly at a discounted rate. [facility] offers low cost prescriptions drugs when seen by a formerly [facility] physician.   [facility] [address], [place] [id number] [phone]  [facility] [address] or [address] [phone]  [facility] [address], [place] or [address] [place], [place]  [phone]  [facility] [address] [place] [address] [phone]  [facility] [address], [place] [address] [phone]  [facility] [address] [address] [phone]  [facility] [address], [address] [place] [address]  Jose Armando goodrich john aqui o llame al teléfono [phone] o al [phone].     [facility] provides care for chronic disease management, and offers coordinated, patient-centered care.  Their services include adult health, pediatric health, women's health, dental services, behavioral health services and LGBTQ+ health.  [facility] works to eliminate any barriers that may keep our patients from accessing services. We do this by providing 24-hour access to providers, comprehensive care management, transportation assistance, access to reduced-price pharmaceuticals, on-site laboratories, same and next-day appointments,  bilingual staff and translation services.    [facility]- [address] [facility]- [address], [facility]- [address]- [address] [facility]- [address] [facility], [facility]- [address] [facility]- [address] [facility]- [address], [facility]- [address]ol[address]reras, [facility]- [address], [place], [place]     [email or web address]  Main number [phone]    Visit [employer] for discounted prescription drug coupons or [employer] for $4 prescriptions https://www.El[employer]x/cp/4-dollar-prescriptions/4633840    To inquire about [place], call [facility] [phone] or visit [email or web address]    Healthcare.gov - Marketplace insurance    The Extra Help Program: is designed to help eligible Medicare Part D patients with high out of pocket costs. Contact this program directly by calling [employer] at [phone]    Through the Medicare Plan Finder, you can search to find more information about cost and your specific prescription coverage.  www.medicare.gov/find-a-plan.    [facility] Health Care Program (only apply if you do not Qualify for medicaid)  [facility]  [address] [place] [address]; [address]  Mary Guerra  Phone: [phone]  [email or web address]    [facility]  [address] [place] [address] [phone]  [email or web address]    Questions about financial assistance, application, or uninsured discounts can be directed to [phone]     [place] [facility]  [address] [place] [address]  Phone: [phone]  Fax: [phone]  Discharge References/Attachments    About Your Asthma Action Plan (English)  Atrial Fibrillation, Discharge Instructions for (English)       MEDICATIONS AT DISCHARGE     Medication List         PAUSE taking these medications      NIFEdipine ER 30 MG Tb24  Wait to take this until your doctor or other care provider tells you to start again.  Commonly known as: Adalat CC            START taking these medications      apixaban 5 MG Tabs  Commonly known as: Eliquis  Take 1 tablet (5 mg total) by mouth 2 (two) times daily.     dilTIAZem  MG Cp24  Commonly known as: CardIZEM CD  Take 1 capsule (240 mg total) by mouth daily.     fluticasone-salmeterol 250-50 MCG/ACT Aepb  Commonly known as: Advair Diskus  Inhale 1 puff into the lungs 2 (two) times daily.     predniSONE 20 MG Tabs  Commonly known as: Deltasone  Take 2 tablets (40 mg total) by mouth daily with breakfast for 5 days.            CONTINUE taking these medications      albuterol 108 (90 Base) MCG/ACT Aers  Commonly known as: Ventolin HFA     atorvastatin 10 MG Tabs  Commonly known as: Lipitor     ergocalciferol 1.25 MG (21591 UT) Caps  Commonly known as: Vitamin D2     metFORMIN 500 MG Tabs  Commonly known as: Glucophage     montelukast 10 MG Tabs  Commonly known as: Singulair     Multi-Vitamin/Minerals Tabs     valsartan 320 MG Tabs  Commonly known as: Diovan               Where to Get Your Medications        These medications were sent to [facility] [address] [phone], [phone]  [address], [facility] [address]      Phone: [phone]   apixaban 5 MG Tabs       These medications were sent to [id number] - [place], [place] - [address] AT [facility] [address], [phone], [phone] [address], [facility] [address]      Phone: [phone]   dilTIAZem  MG Cp24  fluticasone-salmeterol 250-50 MCG/ACT Aepb  predniSONE 20 MG Tabs     PHYSICAL EXAMINATION  Vitals: BP (!) 151/99 (BP Location: Right arm)   Pulse 106   Temp 98 °F (36.7 °C) (Oral)   Resp 18   Wt 220 lb 11.2 oz (100.1 kg)    SpO2 94%   BMI 31.67 kg/m²   Gen: A&Ox3, NAD  Eyes: PERRL, normal conjunctivae  HENT: Normocephalic, trachea midline, moist mucous membranes  CV: RRR, no peripheral edema  Resp: Wheezy, non-labored respirations, symmetric expansion  GI: Soft, NT, ND, no guarding  MSK:  No C/C/E, normal active/passive ROM in C-spine  Skin: No rashes  Neuro: CN 2-12 grossly intact     DISCHARGE CONDITION  Stable    DISCHARGE LOCATION  Home    Total time coordinating care: 45 minutes. (Hx, exam, chart review, discussing plan w/ patient/family, nursing/consultants). 50% of the time F2F for counseling, patient education, answering questions & coordination of care.    Patient's prior to admission medications were reviewed, all current medications reviewed. Patient's current and discharge medications have been reconciled and reviewed verbally with the patient and or family members.      Raj Garcia MD   Internal Medicine - [facility]      Objective:    LABORATORY VALUES   Recent Labs   Lab 05/11/25  1254 05/12/25 0447 05/13/25  0537    140 143   K 3.7 3.9  3.9 3.5    108 106   CO2 32.0 31.0 31.0   BUN 9 13 13   CREATSERUM 0.76 0.71 0.69*   ANIONGAP 5 1 6   GLU 84 126* 114*   CA 8.9 8.7 9.1   TP 7.4  --   --    ALB 4.5  --   --    AST 31  --   --    ALT 38  --   --    ALKPHO 90  --   --    BILT 0.5  --   --    EGFRCR 105 107 108    Recent Labs   Lab 05/11/25  1254 05/12/25 0447 05/13/25  0537   WBC 6.3 11.1* 11.3*   HGB 13.2 12.9* 12.8*   HCT 43.3 42.5 42.4   .0 336.0 377.0   MCV 84.4 84.5 85.0   MOPERCENT 11.9  --   --    EOPERCENT 0.8  --   --    BAPERCENT 0.5  --   --       IMAGING, REPORTS, AND DIAGNOSTICS  XR CHEST AP PORTABLE  (CPT=71045)  Result Date: 5/11/2025  CONCLUSION: No acute cardiopulmonary abnormality.    Dictated by (CST): Suhas Cleveland MD on 5/11/2025 at 1:18 PM     Finalized by (CST): Suhas Cleveland MD on 5/11/2025 at 1:19 PM

## 2025-07-25 ENCOUNTER — TELEPHONE (OUTPATIENT)
Dept: PULMONOLOGY | Facility: CLINIC | Age: 60
End: 2025-07-25

## (undated) NOTE — ED AVS SNAPSHOT
Damon Finney   MRN: [id number]    Department:  [facility] Emergency Department   Date of Visit:  3/28/2019           Disclosure     Insurance plans vary and the physician(s) referred by the ER may not be covered by your plan.  Please contact yo CARE PHYSICIAN AT ONCE OR RETURN IMMEDIATELY TO THE EMERGENCY DEPARTMENT. If you have been prescribed any medication(s), please fill your prescription right away and begin taking the medication(s) as directed.   If you believe that any of the medications

## (undated) NOTE — LETTER
Date & Time: 3/28/2019, 4:13 AM  Patient: Ana Anne  Encounter Provider(s):    Jefry Andrea MD       To Whom It May Concern:    Ana Anne was seen and treated in our department on 3/28/2019. He may return to work on 3/31/19.   If you have an

## (undated) NOTE — ED AVS SNAPSHOT
Judy Lindsey   MRN: [id number]    Department:  [facility] Emergency Department   Date of Visit:  3/17/2019           Disclosure     Insurance plans vary and the physician(s) referred by the ER may not be covered by your plan.  Please contact yo CARE PHYSICIAN AT ONCE OR RETURN IMMEDIATELY TO THE EMERGENCY DEPARTMENT. If you have been prescribed any medication(s), please fill your prescription right away and begin taking the medication(s) as directed.   If you believe that any of the medications

## (undated) NOTE — ED AVS SNAPSHOT
Noreen Jalloh   MRN: [id number]    Department:  [facility] Emergency Department   Date of Visit:  1/25/2020           Disclosure     Insurance plans vary and the physician(s) referred by the ER may not be covered by your plan.  Please contact yo CARE PHYSICIAN AT ONCE OR RETURN IMMEDIATELY TO THE EMERGENCY DEPARTMENT. If you have been prescribed any medication(s), please fill your prescription right away and begin taking the medication(s) as directed.   If you believe that any of the medications

## (undated) NOTE — LETTER
Date & Time: 3/28/2019, 4:12 AM  Patient: Curry Patrick  Encounter Provider(s):    Magalie Arango MD       To Whom It May Concern:    Curry Patrick was seen and treated in our department on 3/28/2019. He may return to work on 4/1/19.     If you have a